# Patient Record
Sex: MALE | Race: WHITE | Employment: FULL TIME | URBAN - METROPOLITAN AREA
[De-identification: names, ages, dates, MRNs, and addresses within clinical notes are randomized per-mention and may not be internally consistent; named-entity substitution may affect disease eponyms.]

---

## 2017-02-01 ENCOUNTER — OFFICE VISIT (OUTPATIENT)
Dept: FAMILY MEDICINE CLINIC | Facility: CLINIC | Age: 47
End: 2017-02-01

## 2017-02-01 VITALS
HEIGHT: 70 IN | BODY MASS INDEX: 39.37 KG/M2 | SYSTOLIC BLOOD PRESSURE: 150 MMHG | TEMPERATURE: 98 F | OXYGEN SATURATION: 98 % | HEART RATE: 96 BPM | DIASTOLIC BLOOD PRESSURE: 100 MMHG | WEIGHT: 275 LBS

## 2017-02-01 DIAGNOSIS — E11.9 TYPE 2 DIABETES MELLITUS WITHOUT COMPLICATION, WITHOUT LONG-TERM CURRENT USE OF INSULIN (HCC): Primary | ICD-10-CM

## 2017-02-01 DIAGNOSIS — I10 ESSENTIAL HYPERTENSION: ICD-10-CM

## 2017-02-01 DIAGNOSIS — G44.219 EPISODIC TENSION-TYPE HEADACHE, NOT INTRACTABLE: ICD-10-CM

## 2017-02-01 DIAGNOSIS — Z00.00 ROUTINE PHYSICAL EXAMINATION: ICD-10-CM

## 2017-02-01 PROCEDURE — 99396 PREV VISIT EST AGE 40-64: CPT | Performed by: FAMILY MEDICINE

## 2017-02-01 PROCEDURE — 90686 IIV4 VACC NO PRSV 0.5 ML IM: CPT | Performed by: FAMILY MEDICINE

## 2017-02-01 PROCEDURE — 90715 TDAP VACCINE 7 YRS/> IM: CPT | Performed by: FAMILY MEDICINE

## 2017-02-01 PROCEDURE — 90472 IMMUNIZATION ADMIN EACH ADD: CPT | Performed by: FAMILY MEDICINE

## 2017-02-01 PROCEDURE — 90471 IMMUNIZATION ADMIN: CPT | Performed by: FAMILY MEDICINE

## 2017-02-01 RX ORDER — LANCETS
1 EACH MISCELLANEOUS 2 TIMES DAILY
Qty: 306 EACH | Refills: 4 | Status: SHIPPED | OUTPATIENT
Start: 2017-02-01 | End: 2020-03-04

## 2017-02-01 RX ORDER — LISINOPRIL AND HYDROCHLOROTHIAZIDE 25; 20 MG/1; MG/1
1 TABLET ORAL
Qty: 90 TABLET | Refills: 0 | Status: SHIPPED | OUTPATIENT
Start: 2017-02-01 | End: 2017-10-09

## 2017-02-01 RX ORDER — WEIGH SCALE
MISCELLANEOUS MISCELLANEOUS
Qty: 1 EACH | Refills: 0 | Status: SHIPPED | OUTPATIENT
Start: 2017-02-01 | End: 2020-03-04

## 2017-02-01 RX ORDER — ASPIRIN 81 MG/1
81 TABLET, CHEWABLE ORAL DAILY
Qty: 100 TABLET | Refills: 5 | Status: SHIPPED | OUTPATIENT
Start: 2017-02-01 | End: 2017-12-20

## 2017-02-01 RX ORDER — DIPHENHYDRAMINE HYDROCHLORIDE 25 MG/1
CAPSULE, LIQUID FILLED ORAL
Qty: 1 KIT | Refills: 0 | Status: SHIPPED | OUTPATIENT
Start: 2017-02-01 | End: 2020-03-04

## 2017-02-01 NOTE — H&P
Tyler Lewis is a 51year old male who presents for a complete physical exam.   HPI:   Patient self discontinued his blood pressure medication, diabetes medication, aspirin  Having bilateral occipital headaches intermittently  Denies vision change, n Dehydration 2012   • Snoring    • Hyperglycemia    • Diabetes ([facility] [address] 1/2/2015   • Effusion of lower leg joint 12/16/2014   • Hyperlipidemia 3/12/2015   • Internal derangement of knee joint 7/19/2013     Left Knee    • Cellulitis    • Hiatal hernia    • CAD good rectal tone, prostate shows no masses  EXTREMITIES: no cyanosis, clubbing or edema  NEURO: Oriented times three,cranial nerves are intact,motor and sensory are grossly intact    ASSESSMENT AND PLAN:   Mariaacamelia Carney is a 51year old male who prese

## 2017-02-02 ENCOUNTER — LAB ENCOUNTER (OUTPATIENT)
Dept: LAB | Age: 47
End: 2017-02-02
Attending: FAMILY MEDICINE
Payer: COMMERCIAL

## 2017-02-02 DIAGNOSIS — G44.219 EPISODIC TENSION-TYPE HEADACHE, NOT INTRACTABLE: ICD-10-CM

## 2017-02-02 DIAGNOSIS — E11.9 TYPE 2 DIABETES MELLITUS WITHOUT COMPLICATION, WITHOUT LONG-TERM CURRENT USE OF INSULIN (HCC): ICD-10-CM

## 2017-02-02 DIAGNOSIS — I10 ESSENTIAL HYPERTENSION: ICD-10-CM

## 2017-02-02 DIAGNOSIS — Z00.00 ROUTINE PHYSICAL EXAMINATION: ICD-10-CM

## 2017-02-02 LAB
25-HYDROXYVITAMIN D (TOTAL): 11.4 NG/ML (ref 30–100)
ALBUMIN SERPL-MCNC: 3.8 G/DL (ref 3.5–4.8)
ALP LIVER SERPL-CCNC: 45 U/L (ref 45–117)
ALT SERPL-CCNC: 23 U/L (ref 17–63)
AST SERPL-CCNC: 9 U/L (ref 15–41)
BASOPHILS # BLD AUTO: 0.05 X10(3) UL (ref 0–0.1)
BASOPHILS NFR BLD AUTO: 0.8 %
BILIRUB SERPL-MCNC: 0.8 MG/DL (ref 0.1–2)
BUN BLD-MCNC: 20 MG/DL (ref 8–20)
CALCIUM BLD-MCNC: 8.8 MG/DL (ref 8.3–10.3)
CHLORIDE: 105 MMOL/L (ref 101–111)
CHOLEST SMN-MCNC: 180 MG/DL (ref ?–200)
CO2: 25 MMOL/L (ref 22–32)
CREAT BLD-MCNC: 1.06 MG/DL (ref 0.7–1.3)
CREAT UR-SCNC: 356 MG/DL
EOSINOPHIL # BLD AUTO: 0.17 X10(3) UL (ref 0–0.3)
EOSINOPHIL NFR BLD AUTO: 2.7 %
ERYTHROCYTE [DISTWIDTH] IN BLOOD BY AUTOMATED COUNT: 12.5 % (ref 11.5–16)
EST. AVERAGE GLUCOSE BLD GHB EST-MCNC: 194 MG/DL (ref 68–126)
GLUCOSE BLD-MCNC: 170 MG/DL (ref 70–99)
HBA1C MFR BLD HPLC: 8.4 % (ref ?–5.7)
HCT VFR BLD AUTO: 44.9 % (ref 37–53)
HDLC SERPL-MCNC: 40 MG/DL (ref 45–?)
HDLC SERPL: 4.5 {RATIO} (ref ?–4.97)
HGB BLD-MCNC: 15.8 G/DL (ref 13–17)
IMMATURE GRANULOCYTE COUNT: 0.03 X10(3) UL (ref 0–1)
IMMATURE GRANULOCYTE RATIO %: 0.5 %
LDLC SERPL CALC-MCNC: 107 MG/DL (ref ?–130)
LYMPHOCYTES # BLD AUTO: 1.87 X10(3) UL (ref 0.9–4)
LYMPHOCYTES NFR BLD AUTO: 30.1 %
M PROTEIN MFR SERPL ELPH: 6.6 G/DL (ref 6.1–8.3)
MCH RBC QN AUTO: 31.2 PG (ref 27–33.2)
MCHC RBC AUTO-ENTMCNC: 35.2 G/DL (ref 31–37)
MCV RBC AUTO: 88.7 FL (ref 80–99)
MICROALBUMIN UR-MCNC: 3.48 MG/DL
MICROALBUMIN/CREAT 24H UR-RTO: 9.8 UG/MG (ref ?–30)
MONOCYTES # BLD AUTO: 0.6 X10(3) UL (ref 0.1–0.6)
MONOCYTES NFR BLD AUTO: 9.6 %
NEUTROPHIL ABS PRELIM: 3.5 X10 (3) UL (ref 1.3–6.7)
NEUTROPHILS # BLD AUTO: 3.5 X10(3) UL (ref 1.3–6.7)
NEUTROPHILS NFR BLD AUTO: 56.3 %
NONHDLC SERPL-MCNC: 140 MG/DL (ref ?–130)
PLATELET # BLD AUTO: 175 10(3)UL (ref 150–450)
POTASSIUM SERPL-SCNC: 4 MMOL/L (ref 3.6–5.1)
PSA SERPL-MCNC: 0.72 NG/ML (ref 0.01–4)
RBC # BLD AUTO: 5.06 X10(6)UL (ref 4.3–5.7)
RED CELL DISTRIBUTION WIDTH-SD: 40.4 FL (ref 35.1–46.3)
SODIUM SERPL-SCNC: 138 MMOL/L (ref 136–144)
TRIGLYCERIDES: 165 MG/DL (ref ?–150)
VLDL: 33 MG/DL (ref 5–40)
WBC # BLD AUTO: 6.2 X10(3) UL (ref 4–13)

## 2017-02-02 PROCEDURE — 85025 COMPLETE CBC W/AUTO DIFF WBC: CPT

## 2017-02-02 PROCEDURE — 83036 HEMOGLOBIN GLYCOSYLATED A1C: CPT

## 2017-02-02 PROCEDURE — 84153 ASSAY OF PSA TOTAL: CPT

## 2017-02-02 PROCEDURE — 80053 COMPREHEN METABOLIC PANEL: CPT

## 2017-02-02 PROCEDURE — 82306 VITAMIN D 25 HYDROXY: CPT

## 2017-02-02 PROCEDURE — 36415 COLL VENOUS BLD VENIPUNCTURE: CPT

## 2017-02-02 PROCEDURE — 82043 UR ALBUMIN QUANTITATIVE: CPT

## 2017-02-02 PROCEDURE — 82570 ASSAY OF URINE CREATININE: CPT

## 2017-02-02 PROCEDURE — 80061 LIPID PANEL: CPT

## 2017-07-24 ENCOUNTER — MED REC SCAN ONLY (OUTPATIENT)
Dept: INTERNAL MEDICINE CLINIC | Facility: CLINIC | Age: 47
End: 2017-07-24

## 2017-09-19 ENCOUNTER — TELEPHONE (OUTPATIENT)
Dept: FAMILY MEDICINE CLINIC | Facility: CLINIC | Age: 47
End: 2017-09-19

## 2017-09-19 NOTE — TELEPHONE ENCOUNTER
MISAEL, asked pt to confirm if he will continue as a pt of Dr. Jaylen Barros or if he will be switching to a new PCP.

## 2017-10-09 ENCOUNTER — OFFICE VISIT (OUTPATIENT)
Dept: INTERNAL MEDICINE CLINIC | Facility: CLINIC | Age: 47
End: 2017-10-09

## 2017-10-09 VITALS
HEART RATE: 68 BPM | SYSTOLIC BLOOD PRESSURE: 132 MMHG | WEIGHT: 264 LBS | TEMPERATURE: 98 F | BODY MASS INDEX: 38 KG/M2 | DIASTOLIC BLOOD PRESSURE: 86 MMHG

## 2017-10-09 DIAGNOSIS — E11.9 TYPE 2 DIABETES MELLITUS WITHOUT COMPLICATION, WITHOUT LONG-TERM CURRENT USE OF INSULIN (HCC): ICD-10-CM

## 2017-10-09 DIAGNOSIS — M54.12 CERVICAL RADICULOPATHY: Primary | ICD-10-CM

## 2017-10-09 DIAGNOSIS — I10 ESSENTIAL HYPERTENSION: ICD-10-CM

## 2017-10-09 PROCEDURE — 90686 IIV4 VACC NO PRSV 0.5 ML IM: CPT | Performed by: FAMILY MEDICINE

## 2017-10-09 PROCEDURE — 90471 IMMUNIZATION ADMIN: CPT | Performed by: FAMILY MEDICINE

## 2017-10-09 PROCEDURE — 99214 OFFICE O/P EST MOD 30 MIN: CPT | Performed by: FAMILY MEDICINE

## 2017-10-09 RX ORDER — LISINOPRIL AND HYDROCHLOROTHIAZIDE 25; 20 MG/1; MG/1
1 TABLET ORAL
Qty: 90 TABLET | Refills: 0 | Status: SHIPPED | OUTPATIENT
Start: 2017-10-09 | End: 2017-12-31

## 2017-10-09 RX ORDER — METHYLPREDNISOLONE 4 MG/1
TABLET ORAL
Qty: 1 KIT | Refills: 0 | Status: SHIPPED | OUTPATIENT
Start: 2017-10-09 | End: 2017-10-20 | Stop reason: ALTCHOICE

## 2017-10-09 NOTE — PROGRESS NOTES
HPI:    Patient ID: Natalie Courser is a 49year old male. HPI Here with pain in upper back on the left side with some tingling in shoulder down to left 5th finger. Has had this in the past. Woke up with this. No clear injury. No weakness.  Was given MetFORMIN HCl 1000 MG Oral Tab Take 1 tablet (1,000 mg total) by mouth 2 (two) times daily with meals. Disp: 180 tablet Rfl: 0   Lisinopril-Hydrochlorothiazide 20-25 MG Oral Tab Take 1 tablet by mouth once daily.  Disp: 90 tablet Rfl: 0   Glucose Blood ([facility] Cervical radiculopathy  (primary encounter diagnosis)  1. Reviewed previous A1c off med and recommendations from previous doctor for Metformin dosing. Restart med and take BID as instructed. Check sugars regularly. Reviewed dose.  Discussed importance of co

## 2017-10-10 NOTE — PATIENT INSTRUCTIONS
Long-Term Complications of Diabetes    Diabetes can cause health problems over time. These are called complications. They are more likely to happen if your blood sugar is often too high. Over time, high blood sugar can damage blood vessels in your body. © 0976-6631 [address], [address]. All rights reserved. This information is not intended as a substitute for professional medical care. Always follow your healthcare professional's instructions.

## 2017-10-16 ENCOUNTER — TELEPHONE (OUTPATIENT)
Dept: INTERNAL MEDICINE CLINIC | Facility: CLINIC | Age: 47
End: 2017-10-16

## 2017-10-16 DIAGNOSIS — M54.12 CERVICAL RADICULOPATHY: Primary | ICD-10-CM

## 2017-10-16 NOTE — TELEPHONE ENCOUNTER
Patient states he would like to schedule the Cervical Spine xray first.  Then would AMS want him to see Dr. Greer Villagomez Neurologist as he saw in the past 4//15/16? Please advise.

## 2017-10-16 NOTE — TELEPHONE ENCOUNTER
Calling with an update. Medication is not really helping still having discomfort and pain. He finished his medication yesterday.  Please advise

## 2017-10-16 NOTE — TELEPHONE ENCOUNTER
I placed an order for an x-ray which he may or may not get- if he wants to do PT he doesn't need the x-ray. The other option is to have him see Neurosurgery and do the x-ray before he sees them. It is up to him.

## 2017-10-16 NOTE — TELEPHONE ENCOUNTER
Patient states he saw [facility] on 10/9/17 with left shoulder pain, numbness down arm, given Medrol Dose pack which he finished last night, noticed today pain and numbness is coming back, no worse and only eased while he was taking the Medrol Dose pack, never we

## 2017-10-17 NOTE — TELEPHONE ENCOUNTER
Patient notified to schedule cervical spine xray and schedule an appt to see Dr. Sindi Villarreal or Neurosurgeon at [facility] for evaluation. Pt verbalizes understanding.

## 2017-10-18 ENCOUNTER — HOSPITAL ENCOUNTER (OUTPATIENT)
Dept: GENERAL RADIOLOGY | Age: 47
Discharge: HOME | End: 2017-10-18
Attending: FAMILY MEDICINE
Payer: COMMERCIAL

## 2017-10-18 DIAGNOSIS — M54.12 CERVICAL RADICULOPATHY: ICD-10-CM

## 2017-10-18 PROCEDURE — 72052 X-RAY EXAM NECK SPINE 6/>VWS: CPT | Performed by: FAMILY MEDICINE

## 2017-11-03 ENCOUNTER — TELEPHONE (OUTPATIENT)
Dept: INTERNAL MEDICINE CLINIC | Facility: CLINIC | Age: 47
End: 2017-11-03

## 2017-11-03 NOTE — TELEPHONE ENCOUNTER
Our Pre-Op paperwork faxed to Dr. Anthony Scott. Left knee replacement on 12/18/17. Paperwork in AMS folder.

## 2017-12-04 ENCOUNTER — HOSPITAL ENCOUNTER (OUTPATIENT)
Dept: PHYSICAL THERAPY | Facility: HOSPITAL | Age: 47
Discharge: HOME | End: 2017-12-04
Attending: ORTHOPAEDIC SURGERY
Payer: COMMERCIAL

## 2017-12-04 ENCOUNTER — APPOINTMENT (OUTPATIENT)
Dept: LAB | Facility: HOSPITAL | Age: 47
End: 2017-12-04
Attending: ORTHOPAEDIC SURGERY
Payer: COMMERCIAL

## 2017-12-04 DIAGNOSIS — M17.12 OSTEOARTHRITIS OF LEFT KNEE: ICD-10-CM

## 2017-12-04 DIAGNOSIS — E11.9 TYPE 2 DIABETES MELLITUS WITHOUT COMPLICATION, WITHOUT LONG-TERM CURRENT USE OF INSULIN (HCC): ICD-10-CM

## 2017-12-04 PROCEDURE — 93010 ELECTROCARDIOGRAM REPORT: CPT | Performed by: INTERNAL MEDICINE

## 2017-12-04 PROCEDURE — 87081 CULTURE SCREEN ONLY: CPT

## 2017-12-04 PROCEDURE — 36415 COLL VENOUS BLD VENIPUNCTURE: CPT

## 2017-12-04 PROCEDURE — 85025 COMPLETE CBC W/AUTO DIFF WBC: CPT

## 2017-12-04 PROCEDURE — 80053 COMPREHEN METABOLIC PANEL: CPT

## 2017-12-04 PROCEDURE — 83036 HEMOGLOBIN GLYCOSYLATED A1C: CPT

## 2017-12-04 PROCEDURE — 86900 BLOOD TYPING SEROLOGIC ABO: CPT

## 2017-12-04 PROCEDURE — 86850 RBC ANTIBODY SCREEN: CPT

## 2017-12-04 PROCEDURE — 93005 ELECTROCARDIOGRAM TRACING: CPT

## 2017-12-04 PROCEDURE — 86901 BLOOD TYPING SEROLOGIC RH(D): CPT

## 2017-12-04 PROCEDURE — 85730 THROMBOPLASTIN TIME PARTIAL: CPT

## 2017-12-04 PROCEDURE — 85610 PROTHROMBIN TIME: CPT

## 2017-12-07 ENCOUNTER — TELEPHONE (OUTPATIENT)
Dept: INTERNAL MEDICINE CLINIC | Facility: CLINIC | Age: 47
End: 2017-12-07

## 2017-12-07 ENCOUNTER — OFFICE VISIT (OUTPATIENT)
Dept: INTERNAL MEDICINE CLINIC | Facility: CLINIC | Age: 47
End: 2017-12-07

## 2017-12-07 VITALS
TEMPERATURE: 98 F | WEIGHT: 253 LBS | HEART RATE: 64 BPM | RESPIRATION RATE: 16 BRPM | HEIGHT: 70 IN | DIASTOLIC BLOOD PRESSURE: 70 MMHG | BODY MASS INDEX: 36.22 KG/M2 | SYSTOLIC BLOOD PRESSURE: 122 MMHG

## 2017-12-07 DIAGNOSIS — Z01.818 PREOPERATIVE EXAMINATION: Primary | ICD-10-CM

## 2017-12-07 DIAGNOSIS — E78.5 HYPERLIPIDEMIA, UNSPECIFIED HYPERLIPIDEMIA TYPE: ICD-10-CM

## 2017-12-07 DIAGNOSIS — I10 ESSENTIAL HYPERTENSION: ICD-10-CM

## 2017-12-07 DIAGNOSIS — E11.9 TYPE 2 DIABETES MELLITUS WITHOUT COMPLICATION, WITHOUT LONG-TERM CURRENT USE OF INSULIN (HCC): ICD-10-CM

## 2017-12-07 PROCEDURE — 99243 OFF/OP CNSLTJ NEW/EST LOW 30: CPT | Performed by: FAMILY MEDICINE

## 2017-12-07 NOTE — PATIENT INSTRUCTIONS
Follow-up with me one week after your surgery. Check A1c in one month. You can come here for that test- non-fasting.

## 2017-12-07 NOTE — PROGRESS NOTES
HPI:    Patient ID: Bonita Eisenmenger is a 49year old male. HPI Here for preoperative exam for planned left total knee replacement scheduled for 12/18/17 with Dr. Vidal Manzanares.  Request was sent for consultation due to patient history of diabetes and hyperlipid Comment: socially    Drug use: No    Sexual activity: Not on file     Other Topics Concern   None on file     Social History Narrative   None on file     Family History   Problem Relation Age of Onset   • Cancer Neg    • Heart Disease Neg    • Stroke Neg by mouth daily.  Disp: 100 tablet Rfl: 5   Blood Glucose Monitoring Suppl (BLOOD GLUCOSE MONITOR SYSTEM) w/Device Does not apply Kit As directed / patient choice Disp: 1 kit Rfl: 0   Blood Pressure Monitoring (BLOOD PRESSURE MONITOR/L CUFF) Does not apply M Take 1 tablet by mouth daily.            Imaging & Referrals:  None       UP#[id number]

## 2017-12-08 NOTE — TELEPHONE ENCOUNTER
Faxed H & P,ekg and labs to [facility] at Dr. Scanlon Ready office [phone].

## 2017-12-17 ENCOUNTER — ANESTHESIA EVENT (OUTPATIENT)
Dept: SURGERY | Facility: HOSPITAL | Age: 47
DRG: 470 | End: 2017-12-17
Payer: COMMERCIAL

## 2017-12-17 NOTE — ANESTHESIA PREPROCEDURE EVALUATION
PRE-OP EVALUATION    Patient Name: Kenyon Jaime    Pre-op Diagnosis: LEFT KNEE OSTEOARTHRITIS    Procedure(s):  LEFT TOTAL KNEE REPLACEMENT    Surgeon(s) and Role:     Zena Farr MD - Primary    Pre-op vitals reviewed.         Body mass index is 3 reviewed.   Exercise tolerance: good     MET: >4    (+) obesity  (+) hypertension   (+) hyperlipidemia  (+) CAD    (-) CABG/stent    (+) valvular problems/murmurs and MVP and asymptomatic                       Endo/Other      (+) diabetes  type 2, not using Admission:  **None**    Patient seen in preoperative holding area prior to going back to OR. Plan discussed in detail. Spinal discussed. Risks of bleeding, infection, PDPH, neuropraxia were mentioned.   General anesthesia as a means of backup in the ca

## 2017-12-18 ENCOUNTER — HOSPITAL ENCOUNTER (INPATIENT)
Facility: HOSPITAL | Age: 47
LOS: 2 days | Discharge: HOME HEALTH | DRG: 470 | End: 2017-12-20
Attending: ORTHOPAEDIC SURGERY | Admitting: ORTHOPAEDIC SURGERY
Payer: COMMERCIAL

## 2017-12-18 ENCOUNTER — APPOINTMENT (OUTPATIENT)
Dept: GENERAL RADIOLOGY | Facility: HOSPITAL | Age: 47
DRG: 470 | End: 2017-12-18
Attending: PHYSICIAN ASSISTANT
Payer: COMMERCIAL

## 2017-12-18 ENCOUNTER — SURGERY (OUTPATIENT)
Age: 47
End: 2017-12-18

## 2017-12-18 ENCOUNTER — ANESTHESIA (OUTPATIENT)
Dept: SURGERY | Facility: HOSPITAL | Age: 47
DRG: 470 | End: 2017-12-18
Payer: COMMERCIAL

## 2017-12-18 DIAGNOSIS — M17.12 OSTEOARTHRITIS OF LEFT KNEE: Primary | ICD-10-CM

## 2017-12-18 PROCEDURE — 73560 X-RAY EXAM OF KNEE 1 OR 2: CPT | Performed by: PHYSICIAN ASSISTANT

## 2017-12-18 PROCEDURE — 99253 IP/OBS CNSLTJ NEW/EST LOW 45: CPT | Performed by: HOSPITALIST

## 2017-12-18 PROCEDURE — 0SRD0J9 REPLACEMENT OF LEFT KNEE JOINT WITH SYNTHETIC SUBSTITUTE, CEMENTED, OPEN APPROACH: ICD-10-PCS | Performed by: ORTHOPAEDIC SURGERY

## 2017-12-18 PROCEDURE — 3E0T3BZ INTRODUCTION OF ANESTHETIC AGENT INTO PERIPHERAL NERVES AND PLEXI, PERCUTANEOUS APPROACH: ICD-10-PCS | Performed by: ANESTHESIOLOGY

## 2017-12-18 DEVICE — ATTUNE KNEE SYSTEM FEMORAL POSTERIOR STABILIZED SIZE 8 LEFT CEMENTED
Type: IMPLANTABLE DEVICE | Site: KNEE | Status: FUNCTIONAL
Brand: ATTUNE

## 2017-12-18 DEVICE — ATTUNE KNEE SYSTEM TIBIAL BASE ROTATING PLATFORM SIZE 8 CEMENTED
Type: IMPLANTABLE DEVICE | Site: KNEE | Status: FUNCTIONAL
Brand: ATTUNE

## 2017-12-18 DEVICE — ATTUNE KNEE SYSTEM TIBIAL INSERT ROTATING PLATFORM POSTERIOR STABILIZED SIZE 8 8MM AOX
Type: IMPLANTABLE DEVICE | Site: KNEE | Status: FUNCTIONAL
Brand: ATTUNE

## 2017-12-18 DEVICE — CEMENT BONE RADIOPAQ HOWMEDICA: Type: IMPLANTABLE DEVICE | Site: KNEE | Status: FUNCTIONAL

## 2017-12-18 DEVICE — ATTUNE PINNING SYSTEM
Type: IMPLANTABLE DEVICE | Site: KNEE | Status: FUNCTIONAL
Brand: ATTUNE

## 2017-12-18 RX ORDER — MELATONIN
325
Status: DISCONTINUED | OUTPATIENT
Start: 2017-12-19 | End: 2017-12-20

## 2017-12-18 RX ORDER — DOCUSATE SODIUM 100 MG/1
100 CAPSULE, LIQUID FILLED ORAL 2 TIMES DAILY
Status: DISCONTINUED | OUTPATIENT
Start: 2017-12-18 | End: 2017-12-20

## 2017-12-18 RX ORDER — OXYCODONE HYDROCHLORIDE AND ACETAMINOPHEN 5; 325 MG/1; MG/1
1 TABLET ORAL EVERY 4 HOURS PRN
Qty: 80 TABLET | Refills: 0 | Status: SHIPPED | OUTPATIENT
Start: 2017-12-18 | End: 2017-12-28

## 2017-12-18 RX ORDER — CEFAZOLIN SODIUM/WATER 2 G/20 ML
2 SYRINGE (ML) INTRAVENOUS ONCE
Status: DISCONTINUED | OUTPATIENT
Start: 2017-12-18 | End: 2017-12-18 | Stop reason: HOSPADM

## 2017-12-18 RX ORDER — SCOLOPAMINE TRANSDERMAL SYSTEM 1 MG/1
1 PATCH, EXTENDED RELEASE TRANSDERMAL ONCE
Status: DISCONTINUED | OUTPATIENT
Start: 2017-12-18 | End: 2017-12-20

## 2017-12-18 RX ORDER — DEXTROSE MONOHYDRATE 25 G/50ML
50 INJECTION, SOLUTION INTRAVENOUS
Status: DISCONTINUED | OUTPATIENT
Start: 2017-12-18 | End: 2017-12-18 | Stop reason: HOSPADM

## 2017-12-18 RX ORDER — HYDROMORPHONE HYDROCHLORIDE 1 MG/ML
0.8 INJECTION, SOLUTION INTRAMUSCULAR; INTRAVENOUS; SUBCUTANEOUS EVERY 2 HOUR PRN
Status: DISPENSED | OUTPATIENT
Start: 2017-12-18 | End: 2017-12-20

## 2017-12-18 RX ORDER — POLYETHYLENE GLYCOL 3350 17 G/17G
17 POWDER, FOR SOLUTION ORAL DAILY PRN
Status: DISCONTINUED | OUTPATIENT
Start: 2017-12-18 | End: 2017-12-20

## 2017-12-18 RX ORDER — ONDANSETRON 2 MG/ML
4 INJECTION INTRAMUSCULAR; INTRAVENOUS EVERY 4 HOURS PRN
Status: DISPENSED | OUTPATIENT
Start: 2017-12-18 | End: 2017-12-20

## 2017-12-18 RX ORDER — DIPHENHYDRAMINE HYDROCHLORIDE 50 MG/ML
12.5 INJECTION INTRAMUSCULAR; INTRAVENOUS EVERY 4 HOURS PRN
Status: DISCONTINUED | OUTPATIENT
Start: 2017-12-18 | End: 2017-12-20

## 2017-12-18 RX ORDER — SODIUM PHOSPHATE, DIBASIC AND SODIUM PHOSPHATE, MONOBASIC 7; 19 G/133ML; G/133ML
1 ENEMA RECTAL ONCE AS NEEDED
Status: DISCONTINUED | OUTPATIENT
Start: 2017-12-18 | End: 2017-12-20

## 2017-12-18 RX ORDER — CEFAZOLIN SODIUM/WATER 2 G/20 ML
2 SYRINGE (ML) INTRAVENOUS EVERY 8 HOURS
Status: COMPLETED | OUTPATIENT
Start: 2017-12-18 | End: 2017-12-19

## 2017-12-18 RX ORDER — METOCLOPRAMIDE HYDROCHLORIDE 5 MG/ML
10 INJECTION INTRAMUSCULAR; INTRAVENOUS AS NEEDED
Status: DISCONTINUED | OUTPATIENT
Start: 2017-12-18 | End: 2017-12-18 | Stop reason: HOSPADM

## 2017-12-18 RX ORDER — DIPHENHYDRAMINE HCL 25 MG
25 CAPSULE ORAL EVERY 4 HOURS PRN
Status: DISCONTINUED | OUTPATIENT
Start: 2017-12-18 | End: 2017-12-20

## 2017-12-18 RX ORDER — CYCLOBENZAPRINE HCL 5 MG
5 TABLET ORAL 3 TIMES DAILY PRN
Status: DISCONTINUED | OUTPATIENT
Start: 2017-12-18 | End: 2017-12-20

## 2017-12-18 RX ORDER — GABAPENTIN 300 MG/1
600 CAPSULE ORAL 3 TIMES DAILY
COMMUNITY
End: 2020-03-04 | Stop reason: ALTCHOICE

## 2017-12-18 RX ORDER — OXYCODONE HYDROCHLORIDE 5 MG/1
5 TABLET ORAL EVERY 4 HOURS PRN
Status: ACTIVE | OUTPATIENT
Start: 2017-12-18 | End: 2017-12-20

## 2017-12-18 RX ORDER — MIDAZOLAM HYDROCHLORIDE 1 MG/ML
1 INJECTION INTRAMUSCULAR; INTRAVENOUS EVERY 5 MIN PRN
Status: DISCONTINUED | OUTPATIENT
Start: 2017-12-18 | End: 2017-12-18 | Stop reason: HOSPADM

## 2017-12-18 RX ORDER — OXYCODONE HYDROCHLORIDE 10 MG/1
10 TABLET ORAL EVERY 4 HOURS PRN
Status: DISPENSED | OUTPATIENT
Start: 2017-12-18 | End: 2017-12-20

## 2017-12-18 RX ORDER — HYDROXYZINE PAMOATE 25 MG/1
25 CAPSULE ORAL 3 TIMES DAILY PRN
Qty: 60 CAPSULE | Refills: 0 | Status: SHIPPED | OUTPATIENT
Start: 2017-12-18 | End: 2018-02-27

## 2017-12-18 RX ORDER — INSULIN ASPART 100 [IU]/ML
INJECTION, SOLUTION INTRAVENOUS; SUBCUTANEOUS ONCE
Status: DISCONTINUED | OUTPATIENT
Start: 2017-12-18 | End: 2017-12-18 | Stop reason: HOSPADM

## 2017-12-18 RX ORDER — OXYCODONE HCL 10 MG/1
10 TABLET, FILM COATED, EXTENDED RELEASE ORAL
Status: COMPLETED | OUTPATIENT
Start: 2017-12-18 | End: 2017-12-18

## 2017-12-18 RX ORDER — SODIUM CHLORIDE 9 MG/ML
INJECTION, SOLUTION INTRAVENOUS CONTINUOUS
Status: DISCONTINUED | OUTPATIENT
Start: 2017-12-18 | End: 2017-12-20

## 2017-12-18 RX ORDER — KETOROLAC TROMETHAMINE 30 MG/ML
30 INJECTION, SOLUTION INTRAMUSCULAR; INTRAVENOUS EVERY 6 HOURS
Status: COMPLETED | OUTPATIENT
Start: 2017-12-18 | End: 2017-12-19

## 2017-12-18 RX ORDER — DEXTROSE MONOHYDRATE 25 G/50ML
50 INJECTION, SOLUTION INTRAVENOUS
Status: DISCONTINUED | OUTPATIENT
Start: 2017-12-18 | End: 2017-12-20

## 2017-12-18 RX ORDER — ONDANSETRON 2 MG/ML
4 INJECTION INTRAMUSCULAR; INTRAVENOUS AS NEEDED
Status: DISCONTINUED | OUTPATIENT
Start: 2017-12-18 | End: 2017-12-18 | Stop reason: HOSPADM

## 2017-12-18 RX ORDER — CELECOXIB 200 MG/1
200 CAPSULE ORAL DAILY
Qty: 30 CAPSULE | Refills: 0 | Status: SHIPPED | OUTPATIENT
Start: 2017-12-18 | End: 2018-02-27 | Stop reason: ALTCHOICE

## 2017-12-18 RX ORDER — CEFAZOLIN SODIUM 1 G/3ML
INJECTION, POWDER, FOR SOLUTION INTRAMUSCULAR; INTRAVENOUS
Status: DISCONTINUED | OUTPATIENT
Start: 2017-12-18 | End: 2017-12-18 | Stop reason: HOSPADM

## 2017-12-18 RX ORDER — OXYCODONE HYDROCHLORIDE 5 MG/1
2.5 TABLET ORAL EVERY 4 HOURS PRN
Status: ACTIVE | OUTPATIENT
Start: 2017-12-18 | End: 2017-12-20

## 2017-12-18 RX ORDER — BISACODYL 10 MG
10 SUPPOSITORY, RECTAL RECTAL
Status: DISCONTINUED | OUTPATIENT
Start: 2017-12-18 | End: 2017-12-20

## 2017-12-18 RX ORDER — HYDROMORPHONE HYDROCHLORIDE 1 MG/ML
0.2 INJECTION, SOLUTION INTRAMUSCULAR; INTRAVENOUS; SUBCUTANEOUS EVERY 2 HOUR PRN
Status: ACTIVE | OUTPATIENT
Start: 2017-12-18 | End: 2017-12-20

## 2017-12-18 RX ORDER — HYDROMORPHONE HYDROCHLORIDE 1 MG/ML
0.4 INJECTION, SOLUTION INTRAMUSCULAR; INTRAVENOUS; SUBCUTANEOUS EVERY 5 MIN PRN
Status: DISCONTINUED | OUTPATIENT
Start: 2017-12-18 | End: 2017-12-18 | Stop reason: HOSPADM

## 2017-12-18 RX ORDER — DOCUSATE SODIUM 100 MG/1
100 CAPSULE, LIQUID FILLED ORAL 2 TIMES DAILY
Qty: 60 CAPSULE | Refills: 0 | Status: SHIPPED | OUTPATIENT
Start: 2017-12-18 | End: 2018-02-27 | Stop reason: ALTCHOICE

## 2017-12-18 RX ORDER — BUPIVACAINE HYDROCHLORIDE AND EPINEPHRINE 2.5; 5 MG/ML; UG/ML
INJECTION, SOLUTION EPIDURAL; INFILTRATION; INTRACAUDAL; PERINEURAL AS NEEDED
Status: DISCONTINUED | OUTPATIENT
Start: 2017-12-18 | End: 2017-12-18 | Stop reason: HOSPADM

## 2017-12-18 RX ORDER — NALOXONE HYDROCHLORIDE 0.4 MG/ML
80 INJECTION, SOLUTION INTRAMUSCULAR; INTRAVENOUS; SUBCUTANEOUS AS NEEDED
Status: DISCONTINUED | OUTPATIENT
Start: 2017-12-18 | End: 2017-12-18 | Stop reason: HOSPADM

## 2017-12-18 RX ORDER — GABAPENTIN 300 MG/1
600 CAPSULE ORAL 3 TIMES DAILY
Status: DISCONTINUED | OUTPATIENT
Start: 2017-12-18 | End: 2017-12-20

## 2017-12-18 RX ORDER — ACETAMINOPHEN 325 MG/1
650 TABLET ORAL ONCE
Status: DISCONTINUED | OUTPATIENT
Start: 2017-12-18 | End: 2017-12-18 | Stop reason: HOSPADM

## 2017-12-18 RX ORDER — SODIUM CHLORIDE, SODIUM LACTATE, POTASSIUM CHLORIDE, CALCIUM CHLORIDE 600; 310; 30; 20 MG/100ML; MG/100ML; MG/100ML; MG/100ML
INJECTION, SOLUTION INTRAVENOUS CONTINUOUS
Status: DISCONTINUED | OUTPATIENT
Start: 2017-12-18 | End: 2017-12-18 | Stop reason: HOSPADM

## 2017-12-18 RX ORDER — MEPERIDINE HYDROCHLORIDE 25 MG/ML
12.5 INJECTION INTRAMUSCULAR; INTRAVENOUS; SUBCUTANEOUS AS NEEDED
Status: DISCONTINUED | OUTPATIENT
Start: 2017-12-18 | End: 2017-12-18 | Stop reason: HOSPADM

## 2017-12-18 RX ORDER — SODIUM CHLORIDE, SODIUM LACTATE, POTASSIUM CHLORIDE, CALCIUM CHLORIDE 600; 310; 30; 20 MG/100ML; MG/100ML; MG/100ML; MG/100ML
INJECTION, SOLUTION INTRAVENOUS CONTINUOUS
Status: DISCONTINUED | OUTPATIENT
Start: 2017-12-18 | End: 2017-12-18

## 2017-12-18 RX ORDER — DIPHENHYDRAMINE HYDROCHLORIDE 50 MG/ML
25 INJECTION INTRAMUSCULAR; INTRAVENOUS ONCE AS NEEDED
Status: ACTIVE | OUTPATIENT
Start: 2017-12-18 | End: 2017-12-18

## 2017-12-18 RX ORDER — SENNOSIDES 8.6 MG
17.2 TABLET ORAL NIGHTLY
Status: DISCONTINUED | OUTPATIENT
Start: 2017-12-18 | End: 2017-12-20

## 2017-12-18 RX ORDER — METOCLOPRAMIDE HYDROCHLORIDE 5 MG/ML
10 INJECTION INTRAMUSCULAR; INTRAVENOUS EVERY 6 HOURS PRN
Status: DISPENSED | OUTPATIENT
Start: 2017-12-18 | End: 2017-12-20

## 2017-12-18 RX ORDER — HYDROMORPHONE HYDROCHLORIDE 1 MG/ML
0.4 INJECTION, SOLUTION INTRAMUSCULAR; INTRAVENOUS; SUBCUTANEOUS EVERY 2 HOUR PRN
Status: DISPENSED | OUTPATIENT
Start: 2017-12-18 | End: 2017-12-20

## 2017-12-18 RX ORDER — OXYCODONE HCL 10 MG/1
10 TABLET, FILM COATED, EXTENDED RELEASE ORAL
Status: COMPLETED | OUTPATIENT
Start: 2017-12-18 | End: 2017-12-19

## 2017-12-18 RX ORDER — ACETAMINOPHEN 325 MG/1
650 TABLET ORAL 4 TIMES DAILY
Status: DISCONTINUED | OUTPATIENT
Start: 2017-12-18 | End: 2017-12-19

## 2017-12-18 NOTE — PROGRESS NOTES
Valerie Mike   10/18/2017 7:45 AM   Office Visit   MRN:  [id number]   Description: 49year old male Provider: Suzette Lemus MD Department: [facility]   Scanning Cover Sheet     Click to print [address] for scanning   Office Comment: hernia repair  No date: OTHER SURGICAL HISTORY      Comment: Nose, and \"broken arm\"        Family History   Problem Relation Age of Onset   • Cancer Neg     • Heart Disease Neg     • Stroke Neg         reports that he has never smoked.  He ha Patient’s diagnosis and treatment options were reviewed. What osteoarthritis is and what the severity of patient's disease was discussed.   Conservative care with symptomatic management including weight control, physical exercises/therapy, medications, inj

## 2017-12-18 NOTE — PHYSICAL THERAPY NOTE
PHYSICAL THERAPY KNEE EVALUATION - INPATIENT     Room Number: 353/353-A  Evaluation Date: 12/18/2017  Type of Evaluation: Initial  Physician Order: PT Eval and Treat    Presenting Problem: s/p left TKA 12/18/17  Reason for Therapy: Mobility Dysfunction and home    SUBJECTIVE  \"This really isn't too bad\" referring to walking    Patient self-stated goal is to go home    OBJECTIVE  Precautions: None  Fall Risk: Standard fall risk    WEIGHT BEARING RESTRICTION  Weight Bearing Restriction: L lower extremity ABILITY STATUS  Gait Assessment   Gait Assistance: Minimum assistance  Distance (ft): 150  Assistive Device: Rolling walker  Pattern: L Decreased stance time  Stoop/Curb Assistance: Not tested       Skilled Therapy Provided: Pt recd in supine, spouse shelbi presentation is stable and overall the evaluation complexity is considered low. These impairments and comorbidities manifest themselves as functional limitations in independent bed mobility, transfers, and gait.   The patient is below baseline and would be

## 2017-12-18 NOTE — CONSULTS
NATHANIEL HOSPITALIST  CONSULT     Berta Mike Patient Status:  Inpatient    1970 MRN [id number]   [facility] 3SW[facility]A Attending Bib Swan MD   Hosp Day # 0 PCP Vivian Armas DO     Reason for consult: medical management    Requ reports that he does not use drugs.     Family History:   Family History   Problem Relation Age of Onset   • Cancer Neg    • Heart Disease Neg    • Stroke Neg        Allergies: No Known Allergies    Medications:    No current facility-administered medicatio and Back: No tenderness or deformity. Abdomen: Soft, nontender, nondistended. Positive bowel sounds. No rebound, guarding or organomegaly. Neurologic: No focal neurological deficits. CNII-XII grossly intact. Musculoskeletal: Moves all extremities.   Ext

## 2017-12-18 NOTE — OPERATIVE REPORT
TOTAL KNEE OPERATIVE REPORT:  Kenyon Jaime       [id number]     5/6/1970    PREOP DX: LEFT  KNEE PRIMARY OSTEOARTHRITIS  POSTOP DX:LEFT KNEE PRIMARY OSTEOARTHRITIS  PROCEDURE: LEFT TOTAL KNEE REPLACEMENT  SURGEON: MD FIRST Jesus Patterson FEMUR WAS FINISHED OFF WITH CHAMFER AND [address] CUTS IN USUAL FASHION. POSTERIOR FEMORAL OSTEOPHYTES WERE REMOVED. POSTERIOR CAPSULAR RELEASE WAS DONE CAREFULLY. PROXIMAL TIBIA WAS EXPOSED.   TIBIA WAS SIZED at 8 AND ROTATION WAS SET USING MEDIAL 1/3 OF

## 2017-12-18 NOTE — ANESTHESIA POSTPROCEDURE EVALUATION
[facility] Patient Status:  Surgery Admit   Age/Gender 49year old male MRN [id number]   [facility] SURGERY Attending Svetlaan Zheng MD   Hosp Day # 0 PCP Dorota Garcias DO       Anesthesia Post-op Note    Procedure(s

## 2017-12-18 NOTE — H&P
Valerie Mederos Cee   12/7/2017 9:00 AM   Office Visit   MRN:  [id number]   Description: 49year old male Provider: Shubham Rolon DO Department: [facility] [address] to print [address] for scanning   Off • Skin tag 10/2012   • Snoring     • Unspecified essential hypertension     • Visual impairment       glasses   • Vomiting 2012      Past Surgical History:  No date: HERNIA SURGERY      Comment: hernia repair  No date: OTHER      Comment: right ankle soft Empagliflozin (JARDIANCE) 10 MG Oral Tab Take 1 tablet by mouth daily.  Disp: 30 tablet Rfl: 0   gabapentin 300 MG Oral Cap 1 tab nightly 2 days then 1 tab BID 4 days then 1 tab TID Disp: 90 capsule Rfl: 3   MetFORMIN HCl 1000 MG Oral Tab Take 1 tablet (1,0 Psychiatric: He has a normal mood and affect.  His behavior is normal.   Vitals reviewed.                ASSESSMENT/PLAN:   Preoperative examination  (primary encounter diagnosis)  Type 2 diabetes mellitus without complication, without long-term current use /99 (BP Location: Left arm)   Pulse 88   Temp 98.4 °F (36.9 °C) (Temporal)   Resp 17   Ht 5' 10\" (1.778 m)   Wt 241 lb 15.3 oz (109.8 kg)   SpO2 98%   BMI 34.72 kg/m²     left leg skin intact. Both calves are NT  MS intact. +DP    Labs reviewed.

## 2017-12-19 PROCEDURE — 99232 SBSQ HOSP IP/OBS MODERATE 35: CPT | Performed by: HOSPITALIST

## 2017-12-19 RX ORDER — OXYCODONE HYDROCHLORIDE AND ACETAMINOPHEN 5; 325 MG/1; MG/1
2 TABLET ORAL EVERY 4 HOURS PRN
Status: DISCONTINUED | OUTPATIENT
Start: 2017-12-20 | End: 2017-12-20

## 2017-12-19 RX ORDER — OXYCODONE HYDROCHLORIDE AND ACETAMINOPHEN 5; 325 MG/1; MG/1
1 TABLET ORAL EVERY 4 HOURS PRN
Status: DISCONTINUED | OUTPATIENT
Start: 2017-12-20 | End: 2017-12-20

## 2017-12-19 RX ORDER — ACETAMINOPHEN 325 MG/1
650 TABLET ORAL 4 TIMES DAILY
Status: COMPLETED | OUTPATIENT
Start: 2017-12-19 | End: 2017-12-19

## 2017-12-19 NOTE — PAYOR COMM NOTE
--------------  Signed Inpt order for ELIOT  noted in Media. This is an inpt-only procedure.     ADMISSION REVIEW     Payor: ALL SAVERS  Subscriber #:  [de-identified]  Authorization Number: [id number]    Admit date: 12/18/17  Admit time: 1252     DIRECT TO OR FOR: 7553 New Bag 2 g Intravenous Tiff Valenzuela RN    12/18/2017 1828 New Bag 2 g Intravenous Pattie Kasper RN      docusate sodium (COLACE) cap 100 mg     Date Action Dose Route User    12/19/2017 0849 Given 100 mg Oral Pattie Kasper RN    12/18/2017 2056 Clare Grubbs tab 10 mg     Date Action Dose Route User    12/19/2017 1221 Given 10 mg Oral Shelia Kip, RN    12/19/2017 0850 Given 10 mg Oral Shelia Kip, RN    12/18/2017 2058 Given 10 mg Oral Junella Arlene, RN    12/18/2017 1532 Given 10 mg Oral Shelia Idojing, RN

## 2017-12-19 NOTE — HOME CARE LIAISON
MET WITH PTNT TO DISCUSS HOME HEALTH SERVICES AND COVERAGE CRITERIA. PTNT AGREEABLE TO Anoop Silva. PTNT GIVEN RESIDENTIAL BROCHURE. RESIDENTIAL WITH PROVIDE SN/PT ON DISCHARGE.     Thank you for this referral,   Jordin Vega

## 2017-12-19 NOTE — CM/SW NOTE
12/19/17 1500   CM/SW Referral Data   Referral Source Physician   Reason for Referral Discharge planning   Informant Patient;Edward Staff   Pertinent Medical Hx   Primary Care Physician Name DO Latanya   Patient Info   Patient's Mental Status Alert

## 2017-12-19 NOTE — OCCUPATIONAL THERAPY NOTE
OCCUPATIONAL THERAPY QUICK EVALUATION - INPATIENT    Room Number: 353/353-A  Evaluation Date: 12/19/2017     Type of Evaluation: Quick Eval  Presenting Problem: s/p L TKR    Physician Order: IP Consult to Occupational Therapy  Reason for Therapy:  ADL/IADL shower; Shower chair       Occupation/Status: FT   Hand Dominance: Right  Drives: Yes  Patient Regularly Uses: Glasses    Prior Level of Function: I with ADL/IADLs without use of AD. Patient works full time.   Patient has children and and toilet with mod I w/ good hand placement. Patient has comfort height toilet seats and has a shower chair. Patient End of Session: Up in chair; With [facility] staff;Needs met;Call light within reach;RN aware of session/findings; All patient questions a to dress lower extremities: at previous functional level  Patient/Caregiver able to demonstrate safety with ADLS: at previous functional level

## 2017-12-19 NOTE — PHYSICAL THERAPY NOTE
PHYSICAL THERAPY KNEE TREATMENT NOTE - INPATIENT     Room Number: 353/353-A     Session: 1&2   Number of Visits to Meet Established Goals: 3    Presenting Problem: s/p left TKA 12/18/17    Problem List  Active Problems:    Essential hypertension    Type 2 Good  Dynamic Sitting: Good  Static Standing: Fair -  Dynamic Standing: Poor +    ACTIVITY TOLERANCE  Room air  No shortness of breath    AM-PAC '6-Clicks' INPATIENT SHORT FORM - BASIC MOBILITY  How much difficulty does the patient currently have. ..  -   T Flexion 10 reps 15 reps   Standing heel/toe raises 10 reps 15 reps   Standing knee flexion 10 reps 15 reps   Extension stretch  1x 1x     Comments: Pt participated in group session, tolerance was good.    was present yes   is a son    Knee ROM flexion      Goal #6        Goal Comments: Goals established on 12/18/2017  Progressing toward all

## 2017-12-19 NOTE — PROGRESS NOTES
NATHANIEL HOSPITALIST  Progress Note     Clemente Evans Patient Status:  Inpatient    1970 MRN [id number]   [facility] 3SW-A Attending Chuck Rodriguez MD   Hosp Day # 1 PCP Renee Chaparro DO     Chief Complaint: medical management    S: ASSESSMENT / PLAN:     1. Essential hypertension  1. Hold antihypertensives, restart in am if BP starts to trend up   2. Was hypotensive yesterday  2. DM type II  1. Hyperglycemia protocol  2. novolog SSI  3. Cerivical radiculopathy  1.  Gabapentin

## 2017-12-20 VITALS
HEART RATE: 94 BPM | BODY MASS INDEX: 34.64 KG/M2 | OXYGEN SATURATION: 97 % | WEIGHT: 241.94 LBS | TEMPERATURE: 99 F | RESPIRATION RATE: 18 BRPM | DIASTOLIC BLOOD PRESSURE: 83 MMHG | HEIGHT: 70 IN | SYSTOLIC BLOOD PRESSURE: 142 MMHG

## 2017-12-20 PROCEDURE — 99231 SBSQ HOSP IP/OBS SF/LOW 25: CPT | Performed by: INTERNAL MEDICINE

## 2017-12-20 NOTE — PROGRESS NOTES
NATHANIEL HOSPITALIST  Progress Note     Marcus Herndon Patient Status:  Inpatient    1970 MRN [id number]   [facility] 3SW-A Attending Nadia Akers MD   Hosp Day # 2 PCP Jazz Fay DO     Chief Complaint: medical management    S: ASSESSMENT / PLAN:     1. Essential hypertension  1. Resume BP meds on DC  2. DM type II  1. Hyperglycemia protocol  2. novolog SSI  3. Cerivical radiculopathy  1. Gabapentin   4. OA s/p Knee replacement  1. PT/OT  2. Pain control  3. IS  5.  Dyslipid

## 2017-12-20 NOTE — PROGRESS NOTES
Orthopedic surgery progress note    Gopi Anderson Patient Status:  Inpatient    1970 MRN [id number]   [facility] 3SW-A Attending Svetlana Zheng MD   [facility] Day # 2 PCP Dorota Garcias DO       Subjective:  No complaints.  No calf pain,

## 2017-12-20 NOTE — PLAN OF CARE
Diabetes/Glucose Control    • Glucose maintained within prescribed range Progressing        DISCHARGE PLANNING    • Discharge to home or other facility with appropriate resources Progressing        Impaired Functional Mobility    • Achieve highest/safest l

## 2017-12-20 NOTE — PROGRESS NOTES
Acute Pain Service    Post Op Day 2 Ortho Note    Assessed patient in chair. Patient rates pain 4-5/10 at present. Patient states Percocet is working well to manage pain; denies itching/nausea/dizziness.     Patient able to bear weight on sx leg; equal sens

## 2017-12-20 NOTE — PLAN OF CARE
Diabetes/Glucose Control    • Glucose maintained within prescribed range Adequate for Discharge        DISCHARGE PLANNING    • Discharge to home or other facility with appropriate resources Adequate for Discharge        Impaired Functional Mobility    • Ac

## 2017-12-20 NOTE — PHYSICAL THERAPY NOTE
PHYSICAL THERAPY KNEE TREATMENT NOTE - INPATIENT     Room Number: 353/353-A     Session: 3  Number of Visits to Meet Established Goals: 3    Presenting Problem: s/p left TKA 12/18/17  History related to current admission: pt admitted 12/18/17 for elective mechanics;Breathing techniques;Repositioning    BALANCE  Static Sitting: Good  Dynamic Sitting: Good  Static Standing: Fair -  Dynamic Standing: Fair -    ACTIVITY TOLERANCE  Room air  No shortness of breath    AM-PAC '6-Clicks' INPATIENT SHORT FORM - BASI slides 20 reps    Saq 20 reps    SLR 20 reps    Sitting Knee Flexion 20 reps    Standing heel/toe raises 20 reps    Standing knee flexion 20 reps    Extension stretch  1x      Comments: Pt participated in group session, tolerance was good.    was prese

## 2017-12-21 NOTE — CM/SW NOTE
12/21/17 1600   Discharge disposition   Discharged to: Home-Health   Name of Nazario Proc. Scott Salvatore 1 services after discharge Skilled home care   Discharge transportation Private car

## 2017-12-21 NOTE — DISCHARGE SUMMARY
Discharge Summary  Patient ID:  Rebecca Sandoval  [id number]  87 year old  5/6/1970    Admit date: 12/18/2017    Discharge date and time: 12/20/17    Attending Physician: No att. providers found     Reason for admission: left knee osteoarthritis    Discha Historical    MetFORMIN HCl 1000 MG Oral Tab  Take 1 tablet (1,000 mg total) by mouth 2 (two) times daily with meals. , Normal, Disp-180 tablet, R-0    Lisinopril-Hydrochlorothiazide 20-25 MG Oral Tab  Take 1 tablet by mouth once daily. , Normal, Disp-90 tab

## 2017-12-22 NOTE — PAYOR COMM NOTE
ATTENTION ESME  --------------  DISCHARGE REVIEW    Payor: ALL SAVERS  Subscriber #:  [de-identified]  Authorization Number: [id number]    Admit date: 12/18/17  Admit time:  1252  Discharge Date: 12/20/2017  1:55 PM       Discharge Summary  Patient ID:  Timothy

## 2017-12-23 NOTE — PLAN OF CARE
DISCHARGE PLANNING    • Discharge to home or other facility with appropriate resources Progressing        PAIN - ADULT    • Verbalizes/displays adequate comfort level or patient's stated pain goal Progressing        SAFETY ADULT - FALL    • Free from fall Stable

## 2017-12-27 ENCOUNTER — OFFICE VISIT (OUTPATIENT)
Dept: INTERNAL MEDICINE CLINIC | Facility: CLINIC | Age: 47
End: 2017-12-27

## 2017-12-27 VITALS
TEMPERATURE: 98 F | SYSTOLIC BLOOD PRESSURE: 132 MMHG | WEIGHT: 251 LBS | HEIGHT: 70 IN | DIASTOLIC BLOOD PRESSURE: 84 MMHG | BODY MASS INDEX: 35.93 KG/M2 | RESPIRATION RATE: 16 BRPM | HEART RATE: 72 BPM

## 2017-12-27 DIAGNOSIS — E11.9 TYPE 2 DIABETES MELLITUS WITHOUT COMPLICATION, WITHOUT LONG-TERM CURRENT USE OF INSULIN (HCC): ICD-10-CM

## 2017-12-27 DIAGNOSIS — Z96.652 STATUS POST TOTAL LEFT KNEE REPLACEMENT: Primary | ICD-10-CM

## 2017-12-27 PROCEDURE — 99214 OFFICE O/P EST MOD 30 MIN: CPT | Performed by: FAMILY MEDICINE

## 2017-12-27 NOTE — PROGRESS NOTES
HPI:    Patient ID: Abi Batres is a 49year old male. HPI Here for f/u from left total knee replacement with Dr. Bren Novoa on 12/18/17. Patient is taking DVT prophylaxis, oxycodone, vistaril as prescribed with no issues.  Has been taking stool softener Current Outpatient Prescriptions:  gabapentin 300 MG Oral Cap Take 600 mg by mouth 3 (three) times daily. Disp:  Rfl:    Empagliflozin (JARDIANCE) 10 MG Oral Tab Take 10 mg by mouth.  Disp:  Rfl:    oxyCODONE-acetaminophen 5-325 MG Oral Tab Take 1 tablet by Mild soft tissue swelling surrounding left knee with posterior bruising. Staples intact and wound closed, no surrounding erythema, no discharge, no warmth. Psychiatric: He has a normal mood and affect. His behavior is normal.   Vitals reviewed.

## 2017-12-31 DIAGNOSIS — E11.9 TYPE 2 DIABETES MELLITUS WITHOUT COMPLICATION, WITHOUT LONG-TERM CURRENT USE OF INSULIN (HCC): ICD-10-CM

## 2017-12-31 DIAGNOSIS — I10 ESSENTIAL HYPERTENSION: ICD-10-CM

## 2018-01-02 RX ORDER — LISINOPRIL AND HYDROCHLOROTHIAZIDE 25; 20 MG/1; MG/1
TABLET ORAL
Qty: 90 TABLET | Refills: 0 | Status: SHIPPED | OUTPATIENT
Start: 2018-01-02 | End: 2018-04-19

## 2018-01-02 NOTE — TELEPHONE ENCOUNTER
E request  Medication(s) to Refill:   Pending Prescriptions Disp Refills    LISINOPRIL-HYDROCHLOROTHIAZIDE 20-25 MG Oral Tab [Pharmacy Med Name: LISINOPRIL-HCTZ 20/25MG TABLETS] 90 tablet 0     Sig: TAKE 1 TABLET BY MOUTH EVERY DAY           Last Time Medi

## 2018-01-03 PROBLEM — Z96.652 STATUS POST TOTAL LEFT KNEE REPLACEMENT: Status: ACTIVE | Noted: 2018-01-03

## 2018-01-08 RX ORDER — EMPAGLIFLOZIN 10 MG/1
1 TABLET, FILM COATED ORAL DAILY
Qty: 90 TABLET | Refills: 0 | Status: SHIPPED | OUTPATIENT
Start: 2018-01-08 | End: 2018-04-19

## 2018-01-08 NOTE — TELEPHONE ENCOUNTER
E request  Medication(s) to Refill:   Pending Prescriptions Disp Refills    JARDIANCE 10 MG Oral Tab [Pharmacy Med Name: JARDIANCE 10MG TABLETS] 30 tablet 0     Sig: TAKE 1 TABLET BY MOUTH DAILY           Last Time Medication was Filled:  historical     [place]

## 2018-01-18 NOTE — TELEPHONE ENCOUNTER
Medication(s) to Refill:   Pending Prescriptions Disp Refills    METFORMIN HCL 1000 MG Oral Tab [Pharmacy Med Name: METFORMIN 1000MG TABLETS] 180 tablet 0     Sig: TAKE 1 TABLET(1000 MG) BY MOUTH TWICE DAILY WITH MEALS           Last Time Medication was Fi has never been prescribed by [facility] provider   [] Other     Last Blood Pressures:  BP Readings from Last 2 Encounters:  12/27/17 : 132/84  12/20/17 : 142/83

## 2018-02-05 RX ORDER — ASPIRIN 81 MG
TABLET,CHEWABLE ORAL
Refills: 4 | OUTPATIENT
Start: 2018-02-05

## 2018-02-05 RX ORDER — ASPIRIN 81 MG
TABLET,CHEWABLE ORAL
Refills: 4 | COMMUNITY
Start: 2017-12-31 | End: 2020-03-04

## 2018-02-21 ENCOUNTER — PATIENT MESSAGE (OUTPATIENT)
Dept: INTERNAL MEDICINE CLINIC | Facility: CLINIC | Age: 48
End: 2018-02-21

## 2018-02-21 DIAGNOSIS — E55.9 VITAMIN D DEFICIENCY: ICD-10-CM

## 2018-02-21 DIAGNOSIS — E11.9 TYPE 2 DIABETES MELLITUS WITHOUT COMPLICATION, WITHOUT LONG-TERM CURRENT USE OF INSULIN (HCC): Primary | ICD-10-CM

## 2018-02-21 NOTE — TELEPHONE ENCOUNTER
From: Abi Batres  To: Tere Escobedo DO  Sent: 2/21/2018 2:27 PM CST  Subject: Visit Follow-up Question    Good afternoon,  It has been two months since my knee replacement surgery and I am still having problems sleeping at night.  On average I am

## 2018-02-22 NOTE — TELEPHONE ENCOUNTER
Yes, please message patient to make f/u office visit to discuss this. He can either come in a few days before and do fasting labs or he can schedule his office visit and come in fasting then and we can draw him that day. Either way, orders are in.  Thank yo

## 2018-02-23 ENCOUNTER — APPOINTMENT (OUTPATIENT)
Dept: LAB | Age: 48
End: 2018-02-23
Attending: FAMILY MEDICINE
Payer: COMMERCIAL

## 2018-02-23 DIAGNOSIS — E55.9 VITAMIN D DEFICIENCY: ICD-10-CM

## 2018-02-23 DIAGNOSIS — E11.9 TYPE 2 DIABETES MELLITUS WITHOUT COMPLICATION, WITHOUT LONG-TERM CURRENT USE OF INSULIN (HCC): ICD-10-CM

## 2018-02-23 LAB
25-HYDROXYVITAMIN D (TOTAL): 20.7 NG/ML (ref 30–100)
ALBUMIN SERPL-MCNC: 4 G/DL (ref 3.5–4.8)
ALP LIVER SERPL-CCNC: 43 U/L (ref 45–117)
ALT SERPL-CCNC: 13 U/L (ref 17–63)
AST SERPL-CCNC: 9 U/L (ref 15–41)
BILIRUB SERPL-MCNC: 0.7 MG/DL (ref 0.1–2)
BUN BLD-MCNC: 23 MG/DL (ref 8–20)
CALCIUM BLD-MCNC: 8.9 MG/DL (ref 8.3–10.3)
CHLORIDE: 103 MMOL/L (ref 101–111)
CHOLEST SMN-MCNC: 181 MG/DL (ref ?–200)
CO2: 24 MMOL/L (ref 22–32)
CREAT BLD-MCNC: 1.15 MG/DL (ref 0.7–1.3)
CREAT UR-SCNC: 182 MG/DL
EST. AVERAGE GLUCOSE BLD GHB EST-MCNC: 137 MG/DL (ref 68–126)
GLUCOSE BLD-MCNC: 152 MG/DL (ref 70–99)
HBA1C MFR BLD HPLC: 6.4 % (ref ?–5.7)
HDLC SERPL-MCNC: 36 MG/DL (ref 45–?)
HDLC SERPL: 5.03 {RATIO} (ref ?–4.97)
LDLC SERPL CALC-MCNC: 115 MG/DL (ref ?–130)
M PROTEIN MFR SERPL ELPH: 7 G/DL (ref 6.1–8.3)
MICROALBUMIN UR-MCNC: 1.73 MG/DL
MICROALBUMIN/CREAT 24H UR-RTO: 9.5 UG/MG (ref ?–30)
NONHDLC SERPL-MCNC: 145 MG/DL (ref ?–130)
POTASSIUM SERPL-SCNC: 3.5 MMOL/L (ref 3.6–5.1)
SODIUM SERPL-SCNC: 138 MMOL/L (ref 136–144)
TRIGL SERPL-MCNC: 152 MG/DL (ref ?–150)
VLDLC SERPL CALC-MCNC: 30 MG/DL (ref 5–40)

## 2018-02-23 PROCEDURE — 82306 VITAMIN D 25 HYDROXY: CPT

## 2018-02-23 PROCEDURE — 83036 HEMOGLOBIN GLYCOSYLATED A1C: CPT

## 2018-02-23 PROCEDURE — 80053 COMPREHEN METABOLIC PANEL: CPT

## 2018-02-23 PROCEDURE — 80061 LIPID PANEL: CPT

## 2018-02-23 PROCEDURE — 82043 UR ALBUMIN QUANTITATIVE: CPT

## 2018-02-23 PROCEDURE — 82570 ASSAY OF URINE CREATININE: CPT

## 2018-02-27 ENCOUNTER — LAB ENCOUNTER (OUTPATIENT)
Dept: LAB | Age: 48
End: 2018-02-27
Attending: FAMILY MEDICINE
Payer: COMMERCIAL

## 2018-02-27 ENCOUNTER — PATIENT MESSAGE (OUTPATIENT)
Dept: INTERNAL MEDICINE CLINIC | Facility: CLINIC | Age: 48
End: 2018-02-27

## 2018-02-27 ENCOUNTER — OFFICE VISIT (OUTPATIENT)
Dept: INTERNAL MEDICINE CLINIC | Facility: CLINIC | Age: 48
End: 2018-02-27

## 2018-02-27 VITALS
SYSTOLIC BLOOD PRESSURE: 100 MMHG | WEIGHT: 240 LBS | BODY MASS INDEX: 34 KG/M2 | HEART RATE: 72 BPM | RESPIRATION RATE: 16 BRPM | TEMPERATURE: 98 F | DIASTOLIC BLOOD PRESSURE: 80 MMHG

## 2018-02-27 DIAGNOSIS — E11.9 TYPE 2 DIABETES MELLITUS WITHOUT COMPLICATION, WITHOUT LONG-TERM CURRENT USE OF INSULIN (HCC): Primary | ICD-10-CM

## 2018-02-27 DIAGNOSIS — Z13.0 SCREENING FOR DEFICIENCY ANEMIA: ICD-10-CM

## 2018-02-27 DIAGNOSIS — E55.9 VITAMIN D DEFICIENCY: ICD-10-CM

## 2018-02-27 DIAGNOSIS — I10 ESSENTIAL HYPERTENSION: ICD-10-CM

## 2018-02-27 DIAGNOSIS — G47.00 INSOMNIA, UNSPECIFIED TYPE: ICD-10-CM

## 2018-02-27 DIAGNOSIS — Z96.652 STATUS POST TOTAL LEFT KNEE REPLACEMENT: ICD-10-CM

## 2018-02-27 LAB
BASOPHILS # BLD AUTO: 0.05 X10(3) UL (ref 0–0.1)
BASOPHILS NFR BLD AUTO: 0.8 %
EOSINOPHIL # BLD AUTO: 0.29 X10(3) UL (ref 0–0.3)
EOSINOPHIL NFR BLD AUTO: 4.5 %
ERYTHROCYTE [DISTWIDTH] IN BLOOD BY AUTOMATED COUNT: 12.5 % (ref 11.5–16)
HCT VFR BLD AUTO: 45.1 % (ref 37–53)
HGB BLD-MCNC: 15.4 G/DL (ref 13–17)
IMMATURE GRANULOCYTE COUNT: 0.01 X10(3) UL (ref 0–1)
IMMATURE GRANULOCYTE RATIO %: 0.2 %
LYMPHOCYTES # BLD AUTO: 1.96 X10(3) UL (ref 0.9–4)
LYMPHOCYTES NFR BLD AUTO: 30.6 %
MCH RBC QN AUTO: 29.4 PG (ref 27–33.2)
MCHC RBC AUTO-ENTMCNC: 34.1 G/DL (ref 31–37)
MCV RBC AUTO: 86.1 FL (ref 80–99)
MONOCYTES # BLD AUTO: 0.54 X10(3) UL (ref 0.1–1)
MONOCYTES NFR BLD AUTO: 8.4 %
NEUTROPHIL ABS PRELIM: 3.55 X10 (3) UL (ref 1.3–6.7)
NEUTROPHILS # BLD AUTO: 3.55 X10(3) UL (ref 1.3–6.7)
NEUTROPHILS NFR BLD AUTO: 55.5 %
PLATELET # BLD AUTO: 262 10(3)UL (ref 150–450)
RBC # BLD AUTO: 5.24 X10(6)UL (ref 4.3–5.7)
RED CELL DISTRIBUTION WIDTH-SD: 39.1 FL (ref 35.1–46.3)
WBC # BLD AUTO: 6.4 X10(3) UL (ref 4–13)

## 2018-02-27 PROCEDURE — 85025 COMPLETE CBC W/AUTO DIFF WBC: CPT | Performed by: FAMILY MEDICINE

## 2018-02-27 PROCEDURE — 99214 OFFICE O/P EST MOD 30 MIN: CPT | Performed by: FAMILY MEDICINE

## 2018-02-27 NOTE — PATIENT INSTRUCTIONS
Prevention Guidelines, Men Ages 36 to 52  Screening tests and vaccines are an important part of managing your health. Health counseling is essential, too. Below are guidelines for these, for men ages 36 to 52.  Talk with your healthcare provider to make s Chickenpox (varicella) All men in this age group who have no record of this infection or vaccine 2 doses; the second dose should be given at least 4 weeks after the first dose   Hepatitis A Men at increased risk for infection – talk with your healthcare pr Use of tobacco and the health effects it can cause All men in this age group Every exam   [facility]  Date Last Reviewed: 2/1/2017  © 7142-0020 [address].  [facility]

## 2018-02-27 NOTE — PROGRESS NOTES
HPI:    Patient ID: Danae Morales is a 49year old male. HPI Here for f/u. Is frustrated about his left knee replacement and not being at 100% yet. Had this done mid-December. Is continuing to work with PT.  Notes pain at night that wakes him up melissa and shortness of breath. Cardiovascular: Negative for chest pain and palpitations.               Current Outpatient Prescriptions:  ASPIRIN LOW DOSE 81 MG Oral Chew Tab  Disp:  Rfl: 4   METFORMIN HCL 1000 MG Oral Tab TAKE 1 TABLET(1000 MG) BY MOUTH TWICE monitoring carbs. If low sugars, patient aware we will adjust meds. Recheck A1c in 2-3 months. 3. Start vitamin D3 5000IU daily. Recheck in 2-3 months. 4. BP at goal. Continue current medication. 5. Stable. Continue PT.  Continue f/u with Ortho if nee

## 2018-02-28 NOTE — TELEPHONE ENCOUNTER
From: Clemente Evans  To: Cameron Lovell DO  Sent: 2/27/2018 3:24 PM CST  Subject: Test Results Question    Thank you for letting me know much appreciated.  I will come back in for the [facility] test in two months  Thanks  Again  nick

## 2018-04-19 DIAGNOSIS — E11.9 TYPE 2 DIABETES MELLITUS WITHOUT COMPLICATION, WITHOUT LONG-TERM CURRENT USE OF INSULIN (HCC): ICD-10-CM

## 2018-04-19 DIAGNOSIS — I10 ESSENTIAL HYPERTENSION: ICD-10-CM

## 2018-04-19 RX ORDER — EMPAGLIFLOZIN 10 MG/1
1 TABLET, FILM COATED ORAL DAILY
Qty: 90 TABLET | Refills: 0 | Status: SHIPPED | OUTPATIENT
Start: 2018-04-19 | End: 2018-08-23

## 2018-04-19 RX ORDER — LISINOPRIL AND HYDROCHLOROTHIAZIDE 25; 20 MG/1; MG/1
TABLET ORAL
Qty: 90 TABLET | Refills: 0 | Status: SHIPPED | OUTPATIENT
Start: 2018-04-19 | End: 2018-08-23

## 2018-04-19 NOTE — TELEPHONE ENCOUNTER
LOV: 2/27/18 AMS  Future office visit: No upcoming visit  Last labs: 2/23/18 U. Micro, A1C Lipid Cmp Vit D   Last RX: Jardiance 1/8/18 #90 No Refills    Lisinopril/HCTZ 1/2/18 #90 No Refills  Per protocol: Passed; routing per provider request to further rev

## 2018-05-04 PROBLEM — M54.12 CERVICAL RADICULOPATHY AT C8: Status: ACTIVE | Noted: 2018-05-04

## 2018-05-04 PROBLEM — M50.20 HNP (HERNIATED NUCLEUS PULPOSUS), CERVICAL: Status: ACTIVE | Noted: 2018-05-04

## 2018-08-16 ENCOUNTER — TELEPHONE (OUTPATIENT)
Dept: INTERNAL MEDICINE CLINIC | Facility: CLINIC | Age: 48
End: 2018-08-16

## 2018-08-16 DIAGNOSIS — E55.9 VITAMIN D DEFICIENCY: ICD-10-CM

## 2018-08-16 DIAGNOSIS — E11.9 TYPE 2 DIABETES MELLITUS WITHOUT COMPLICATION, WITHOUT LONG-TERM CURRENT USE OF INSULIN (HCC): Primary | ICD-10-CM

## 2018-08-16 NOTE — TELEPHONE ENCOUNTER
I called patient to remind him to get his annual diabetic eye exam. Pt stated he was told to come in to just get an A1C. There is an old order from Dec. 2017 for A1C.  Please clarify if patient can still use that order and if he can just get that done at th

## 2018-08-17 NOTE — TELEPHONE ENCOUNTER
Last a1c: 02/23/18     Per LOV notes from [facility] - 02/27/18-     2. Reviewed A1c is at goal. Continue current medication for now. Continue monitoring carbs. If low sugars, patient aware we will adjust meds. Recheck A1c in 2-3 months.    3. Start vitamin D3 500

## 2018-08-17 NOTE — TELEPHONE ENCOUNTER
Please call patient. I placed orders for non-fasting labs. He needs to be seen in office every 6 months at least. It has been since Feb that I saw him so he should make appt for his diabetes. He can get labs done a few days prior to me seeing him then.

## 2018-08-17 NOTE — TELEPHONE ENCOUNTER
Future Appointments  Date Time Provider Ernie Valencia   8/28/2018 8:30 AM Yarely Rhodes, DO [facility] [facility]     Pt to get labs done prior to appt

## 2018-08-22 ENCOUNTER — APPOINTMENT (OUTPATIENT)
Dept: LAB | Age: 48
End: 2018-08-22
Attending: FAMILY MEDICINE
Payer: COMMERCIAL

## 2018-08-22 DIAGNOSIS — E11.9 TYPE 2 DIABETES MELLITUS WITHOUT COMPLICATION, WITHOUT LONG-TERM CURRENT USE OF INSULIN (HCC): ICD-10-CM

## 2018-08-22 DIAGNOSIS — E55.9 VITAMIN D DEFICIENCY: ICD-10-CM

## 2018-08-22 LAB
EST. AVERAGE GLUCOSE BLD GHB EST-MCNC: 146 MG/DL (ref 68–126)
HBA1C MFR BLD HPLC: 6.7 % (ref ?–5.7)
VIT D+METAB SERPL-MCNC: 18.2 NG/ML (ref 30–100)

## 2018-08-22 PROCEDURE — 83036 HEMOGLOBIN GLYCOSYLATED A1C: CPT | Performed by: FAMILY MEDICINE

## 2018-08-22 PROCEDURE — 82306 VITAMIN D 25 HYDROXY: CPT | Performed by: FAMILY MEDICINE

## 2018-08-23 DIAGNOSIS — E11.9 TYPE 2 DIABETES MELLITUS WITHOUT COMPLICATION, WITHOUT LONG-TERM CURRENT USE OF INSULIN (HCC): ICD-10-CM

## 2018-08-23 DIAGNOSIS — I10 ESSENTIAL HYPERTENSION: ICD-10-CM

## 2018-08-23 RX ORDER — LISINOPRIL AND HYDROCHLOROTHIAZIDE 25; 20 MG/1; MG/1
TABLET ORAL
Qty: 90 TABLET | Refills: 0 | Status: SHIPPED | OUTPATIENT
Start: 2018-08-23 | End: 2019-01-06

## 2018-08-23 RX ORDER — EMPAGLIFLOZIN 10 MG/1
1 TABLET, FILM COATED ORAL DAILY
Qty: 90 TABLET | Refills: 0 | Status: SHIPPED | OUTPATIENT
Start: 2018-08-23 | End: 2019-01-06

## 2018-08-23 NOTE — TELEPHONE ENCOUNTER
E request  Medication(s) to Refill:   Pending Prescriptions Disp Refills    LISINOPRIL-HYDROCHLOROTHIAZIDE 20-25 MG Oral Tab [Pharmacy Med Name: LISINOPRIL-HCTZ 20/25MG TABLETS] 90 tablet 0     Sig: TAKE 1 TABLET BY MOUTH EVERY DAY      JARDIANCE 10 MG Ora

## 2018-08-28 ENCOUNTER — OFFICE VISIT (OUTPATIENT)
Dept: INTERNAL MEDICINE CLINIC | Facility: CLINIC | Age: 48
End: 2018-08-28
Payer: COMMERCIAL

## 2018-08-28 VITALS
TEMPERATURE: 98 F | WEIGHT: 241 LBS | HEART RATE: 70 BPM | BODY MASS INDEX: 35 KG/M2 | RESPIRATION RATE: 16 BRPM | DIASTOLIC BLOOD PRESSURE: 60 MMHG | SYSTOLIC BLOOD PRESSURE: 100 MMHG

## 2018-08-28 DIAGNOSIS — E55.9 VITAMIN D DEFICIENCY: ICD-10-CM

## 2018-08-28 DIAGNOSIS — E11.9 TYPE 2 DIABETES MELLITUS WITHOUT COMPLICATION, WITHOUT LONG-TERM CURRENT USE OF INSULIN (HCC): Primary | ICD-10-CM

## 2018-08-28 DIAGNOSIS — I10 ESSENTIAL HYPERTENSION: ICD-10-CM

## 2018-08-28 PROCEDURE — 99214 OFFICE O/P EST MOD 30 MIN: CPT | Performed by: FAMILY MEDICINE

## 2018-08-28 PROCEDURE — 90732 PPSV23 VACC 2 YRS+ SUBQ/IM: CPT | Performed by: FAMILY MEDICINE

## 2018-08-28 PROCEDURE — 90471 IMMUNIZATION ADMIN: CPT | Performed by: FAMILY MEDICINE

## 2018-08-28 NOTE — PATIENT INSTRUCTIONS
Diabetes and Your Child: Understanding Type 2 Diabetes  Marin Lanierwayne been told that your child has type 2 diabetes. This means his or her body has trouble using a sugar called glucose for energy. Diabetes is a lifelong condition.  Left untreated, it can lead to With type 2 diabetes, food is still broken down into glucose. And glucose still travels to the cells. But the pancreas may not make enough insulin for the amount of glucose in the blood. The liver may release too much glucose at once.  And the body’s cells Simple blood tests can help the healthcare provider determine if your child has type 2 diabetes. These tests check for a high level of glucose in your child’s blood. Glucose tests may need to be repeated in order to confirm the diagnosis.   How is type 2 di

## 2018-08-28 NOTE — PROGRESS NOTES
HPI:    Patient ID: Evie Downs is a 51year old male. HPI Here for f/u. Patient has no complaints. Feels great. Taking diabetes meds as prescribed with no issues. No low sugar symptoms. Checks sugars about once per week.  Aware he is due for eye Rfl: 4   gabapentin 300 MG Oral Cap Take 600 mg by mouth 3 (three) times daily. Disp:  Rfl:    rivaroxaban 10 MG Oral Tab Take 1 tablet (10 mg total) by mouth daily.  Disp: 12 tablet Rfl: 0   Glucose Blood (ACCU-CHEK SALO PLUS) In Vitro Strip 1 each by [employer]

## 2018-09-20 ENCOUNTER — TELEPHONE (OUTPATIENT)
Dept: INTERNAL MEDICINE CLINIC | Facility: CLINIC | Age: 48
End: 2018-09-20

## 2018-09-20 NOTE — TELEPHONE ENCOUNTER
Spoke to patient to remind him to schedule his annual diabetic eye exam. Pt states he will schedule it in the next two weeks. He is aware to have the specialist office fax us a copy for his chart.

## 2018-10-22 NOTE — TELEPHONE ENCOUNTER
LOV: 8/28/18 w/ AMS  FOV: 2/26/19 CPE  Last labs: 8/22/18 VIT D,A1C  Last Refill: 5/27/18 qt:180    Per protocol sent to provider

## 2019-01-06 DIAGNOSIS — I10 ESSENTIAL HYPERTENSION: ICD-10-CM

## 2019-01-06 DIAGNOSIS — E11.9 TYPE 2 DIABETES MELLITUS WITHOUT COMPLICATION, WITHOUT LONG-TERM CURRENT USE OF INSULIN (HCC): ICD-10-CM

## 2019-01-07 RX ORDER — EMPAGLIFLOZIN 10 MG/1
1 TABLET, FILM COATED ORAL DAILY
Qty: 90 TABLET | Refills: 0 | Status: SHIPPED | OUTPATIENT
Start: 2019-01-07 | End: 2019-03-27

## 2019-01-07 RX ORDER — LISINOPRIL AND HYDROCHLOROTHIAZIDE 25; 20 MG/1; MG/1
TABLET ORAL
Qty: 90 TABLET | Refills: 0 | Status: SHIPPED | OUTPATIENT
Start: 2019-01-07 | End: 2019-03-27

## 2019-01-07 NOTE — TELEPHONE ENCOUNTER
E request  LOV: 8/28/18- diabetes f/u  Last labs: 8/22/18- 6.7 a1c     2/23/18  Last rx: Jardiance: historical   Last rx Lisinopril: 8/23/18- 90 tablets with 0 refills  Future Appointments   Date Time Provider Ernie Valencia   2/26/2019  7:30 AM Paul

## 2019-02-12 ENCOUNTER — TELEPHONE (OUTPATIENT)
Dept: INTERNAL MEDICINE CLINIC | Facility: CLINIC | Age: 49
End: 2019-02-12

## 2019-02-12 NOTE — TELEPHONE ENCOUNTER
DM eye exam received from [facility]. Results abstracted and placed in AMS bin for review.

## 2019-02-15 ENCOUNTER — TELEPHONE (OUTPATIENT)
Dept: INTERNAL MEDICINE CLINIC | Facility: CLINIC | Age: 49
End: 2019-02-15

## 2019-02-15 DIAGNOSIS — E11.9 TYPE 2 DIABETES MELLITUS WITHOUT COMPLICATION, WITHOUT LONG-TERM CURRENT USE OF INSULIN (HCC): ICD-10-CM

## 2019-02-15 DIAGNOSIS — E55.9 VITAMIN D DEFICIENCY: ICD-10-CM

## 2019-02-15 DIAGNOSIS — Z00.00 ROUTINE GENERAL MEDICAL EXAMINATION AT A HEALTH CARE FACILITY: Primary | ICD-10-CM

## 2019-02-15 NOTE — TELEPHONE ENCOUNTER
Future Appointments   Date Time Provider Ernie Valencia   2/26/2019  7:30 AM Reshma Garcia,  [facility]     Pt would like BW orders sent to THE [facility] pls.  Pt aware to fast.

## 2019-02-19 ENCOUNTER — LAB ENCOUNTER (OUTPATIENT)
Dept: LAB | Age: 49
End: 2019-02-19
Attending: FAMILY MEDICINE
Payer: COMMERCIAL

## 2019-02-19 DIAGNOSIS — E55.9 VITAMIN D DEFICIENCY: ICD-10-CM

## 2019-02-19 DIAGNOSIS — E11.9 TYPE 2 DIABETES MELLITUS WITHOUT COMPLICATION, WITHOUT LONG-TERM CURRENT USE OF INSULIN (HCC): ICD-10-CM

## 2019-02-19 DIAGNOSIS — Z00.00 ROUTINE GENERAL MEDICAL EXAMINATION AT A HEALTH CARE FACILITY: ICD-10-CM

## 2019-02-19 LAB
ALBUMIN SERPL-MCNC: 3.9 G/DL (ref 3.4–5)
ALBUMIN/GLOB SERPL: 1.3 {RATIO} (ref 1–2)
ALP LIVER SERPL-CCNC: 42 U/L (ref 45–117)
ALT SERPL-CCNC: 20 U/L (ref 16–61)
ANION GAP SERPL CALC-SCNC: 8 MMOL/L (ref 0–18)
AST SERPL-CCNC: 15 U/L (ref 15–37)
BASOPHILS # BLD AUTO: 0.06 X10(3) UL (ref 0–0.2)
BASOPHILS NFR BLD AUTO: 0.9 %
BILIRUB SERPL-MCNC: 0.5 MG/DL (ref 0.1–2)
BUN BLD-MCNC: 23 MG/DL (ref 7–18)
BUN/CREAT SERPL: 20.2 (ref 10–20)
CALCIUM BLD-MCNC: 8.5 MG/DL (ref 8.5–10.1)
CHLORIDE SERPL-SCNC: 109 MMOL/L (ref 98–107)
CHOLEST SMN-MCNC: 180 MG/DL (ref ?–200)
CO2 SERPL-SCNC: 25 MMOL/L (ref 21–32)
CREAT BLD-MCNC: 1.14 MG/DL (ref 0.7–1.3)
CREAT UR-SCNC: 218 MG/DL
DEPRECATED RDW RBC AUTO: 41.5 FL (ref 35.1–46.3)
EOSINOPHIL # BLD AUTO: 0.26 X10(3) UL (ref 0–0.7)
EOSINOPHIL NFR BLD AUTO: 3.9 %
ERYTHROCYTE [DISTWIDTH] IN BLOOD BY AUTOMATED COUNT: 12.6 % (ref 11–15)
EST. AVERAGE GLUCOSE BLD GHB EST-MCNC: 160 MG/DL (ref 68–126)
GLOBULIN PLAS-MCNC: 3.1 G/DL (ref 2.8–4.4)
GLUCOSE BLD-MCNC: 155 MG/DL (ref 70–99)
HBA1C MFR BLD HPLC: 7.2 % (ref ?–5.7)
HCT VFR BLD AUTO: 49.2 % (ref 39–53)
HDLC SERPL-MCNC: 34 MG/DL (ref 40–59)
HGB BLD-MCNC: 16.4 G/DL (ref 13–17.5)
IMM GRANULOCYTES # BLD AUTO: 0.03 X10(3) UL (ref 0–1)
IMM GRANULOCYTES NFR BLD: 0.4 %
LDLC SERPL CALC-MCNC: 106 MG/DL (ref ?–100)
LYMPHOCYTES # BLD AUTO: 1.91 X10(3) UL (ref 1–4)
LYMPHOCYTES NFR BLD AUTO: 28.5 %
M PROTEIN MFR SERPL ELPH: 7 G/DL (ref 6.4–8.2)
MCH RBC QN AUTO: 30.4 PG (ref 26–34)
MCHC RBC AUTO-ENTMCNC: 33.3 G/DL (ref 31–37)
MCV RBC AUTO: 91.3 FL (ref 80–100)
MICROALBUMIN UR-MCNC: 1.45 MG/DL
MICROALBUMIN/CREAT 24H UR-RTO: 6.7 UG/MG (ref ?–30)
MONOCYTES # BLD AUTO: 0.55 X10(3) UL (ref 0.1–1)
MONOCYTES NFR BLD AUTO: 8.2 %
NEUTROPHILS # BLD AUTO: 3.9 X10 (3) UL (ref 1.5–7.7)
NEUTROPHILS # BLD AUTO: 3.9 X10(3) UL (ref 1.5–7.7)
NEUTROPHILS NFR BLD AUTO: 58.1 %
NONHDLC SERPL-MCNC: 146 MG/DL (ref ?–130)
OSMOLALITY SERPL CALC.SUM OF ELEC: 301 MOSM/KG (ref 275–295)
PLATELET # BLD AUTO: 246 10(3)UL (ref 150–450)
POTASSIUM SERPL-SCNC: 4.1 MMOL/L (ref 3.5–5.1)
RBC # BLD AUTO: 5.39 X10(6)UL (ref 4.3–5.7)
SODIUM SERPL-SCNC: 142 MMOL/L (ref 136–145)
TRIGL SERPL-MCNC: 198 MG/DL (ref 30–149)
VIT D+METAB SERPL-MCNC: 18 NG/ML (ref 30–100)
VLDLC SERPL CALC-MCNC: 40 MG/DL (ref 0–30)
WBC # BLD AUTO: 6.7 X10(3) UL (ref 4–11)

## 2019-02-19 PROCEDURE — 82043 UR ALBUMIN QUANTITATIVE: CPT | Performed by: FAMILY MEDICINE

## 2019-02-19 PROCEDURE — 80053 COMPREHEN METABOLIC PANEL: CPT | Performed by: FAMILY MEDICINE

## 2019-02-19 PROCEDURE — 85025 COMPLETE CBC W/AUTO DIFF WBC: CPT | Performed by: FAMILY MEDICINE

## 2019-02-19 PROCEDURE — 82306 VITAMIN D 25 HYDROXY: CPT | Performed by: FAMILY MEDICINE

## 2019-02-19 PROCEDURE — 80061 LIPID PANEL: CPT | Performed by: FAMILY MEDICINE

## 2019-02-19 PROCEDURE — 82570 ASSAY OF URINE CREATININE: CPT | Performed by: FAMILY MEDICINE

## 2019-02-19 PROCEDURE — 83036 HEMOGLOBIN GLYCOSYLATED A1C: CPT | Performed by: FAMILY MEDICINE

## 2019-02-26 ENCOUNTER — OFFICE VISIT (OUTPATIENT)
Dept: INTERNAL MEDICINE CLINIC | Facility: CLINIC | Age: 49
End: 2019-02-26
Payer: COMMERCIAL

## 2019-02-26 VITALS
BODY MASS INDEX: 35.36 KG/M2 | OXYGEN SATURATION: 99 % | HEART RATE: 78 BPM | DIASTOLIC BLOOD PRESSURE: 80 MMHG | SYSTOLIC BLOOD PRESSURE: 126 MMHG | RESPIRATION RATE: 16 BRPM | HEIGHT: 70 IN | TEMPERATURE: 97 F | WEIGHT: 247 LBS

## 2019-02-26 DIAGNOSIS — E78.5 HYPERLIPIDEMIA, UNSPECIFIED HYPERLIPIDEMIA TYPE: ICD-10-CM

## 2019-02-26 DIAGNOSIS — I10 ESSENTIAL HYPERTENSION: ICD-10-CM

## 2019-02-26 DIAGNOSIS — Z12.5 SCREENING PSA (PROSTATE SPECIFIC ANTIGEN): ICD-10-CM

## 2019-02-26 DIAGNOSIS — Z00.00 ANNUAL PHYSICAL EXAM: Primary | ICD-10-CM

## 2019-02-26 DIAGNOSIS — E11.9 TYPE 2 DIABETES MELLITUS WITHOUT COMPLICATION, WITHOUT LONG-TERM CURRENT USE OF INSULIN (HCC): ICD-10-CM

## 2019-02-26 DIAGNOSIS — Z12.11 SCREENING FOR MALIGNANT NEOPLASM OF COLON: ICD-10-CM

## 2019-02-26 PROCEDURE — 99213 OFFICE O/P EST LOW 20 MIN: CPT | Performed by: FAMILY MEDICINE

## 2019-02-26 PROCEDURE — 90686 IIV4 VACC NO PRSV 0.5 ML IM: CPT | Performed by: FAMILY MEDICINE

## 2019-02-26 PROCEDURE — 90471 IMMUNIZATION ADMIN: CPT | Performed by: FAMILY MEDICINE

## 2019-02-26 PROCEDURE — 99396 PREV VISIT EST AGE 40-64: CPT | Performed by: FAMILY MEDICINE

## 2019-02-26 RX ORDER — ATORVASTATIN CALCIUM 10 MG/1
10 TABLET, FILM COATED ORAL NIGHTLY
Qty: 90 TABLET | Refills: 0 | Status: SHIPPED | OUTPATIENT
Start: 2019-02-26 | End: 2019-05-22

## 2019-02-26 NOTE — PATIENT INSTRUCTIONS
Start vitamin D3 5000IU daily. Stop potassium to see if this was helping with cramps or not. Continue magnesium. Start Atorvastatin 10mg nightly. We will recheck your cholesterol in 3 months. Restart Aspirin 81mg daily.    We are going to check the All men At routine exams   Obesity All men in this age group At routine exams   Prostate cancer Starting at age 39, talk to healthcare provider about risks and benefits of digital rectal exam (LEXY) and prostate-specific antigen (PSA) screening1 At routine pneumococcal bacteria)      Tetanus/diphtheria/  pertussis (Td/Tdap) booster All men in this age group Td every 10 years, or a one-time dose of Tdap instead of a Td booster after age 25, then Td every 10 years   Counseling Who needs it How often   Diet and

## 2019-02-28 PROBLEM — Z96.652 STATUS POST TOTAL LEFT KNEE REPLACEMENT: Status: RESOLVED | Noted: 2018-01-03 | Resolved: 2019-02-28

## 2019-02-28 NOTE — PROGRESS NOTES
HPI:    Patient ID: Gwendolyn Samuel is a 51year old male. HPI Here for annual check-up. Patient has no complaints. Taking all meds as prescribed with no issues. Checks sugars and has no low sugars.      Past Medical History:   Diagnosis Date   • Acut disturbance. Respiratory: Negative for choking, shortness of breath and wheezing. Cardiovascular: Negative for chest pain, palpitations and leg swelling. Gastrointestinal: Negative for abdominal pain, constipation, diarrhea, nausea and vomiting.    E Normocephalic and atraumatic.    Right Ear: Tympanic membrane, external ear and ear canal normal.   Left Ear: Tympanic membrane, external ear and ear canal normal.   Mouth/Throat: Oropharynx is clear and moist and mucous membranes are normal. No posterior o Referrals:  FLULAVAL INFLUENZA VACCINE QUAD PRESERVATIVE FREE 0.5 ML  GASTRO - INTERNAL       NF#[id number]

## 2019-03-27 DIAGNOSIS — E11.9 TYPE 2 DIABETES MELLITUS WITHOUT COMPLICATION, WITHOUT LONG-TERM CURRENT USE OF INSULIN (HCC): ICD-10-CM

## 2019-03-27 DIAGNOSIS — I10 ESSENTIAL HYPERTENSION: ICD-10-CM

## 2019-03-27 RX ORDER — EMPAGLIFLOZIN 10 MG/1
1 TABLET, FILM COATED ORAL DAILY
Qty: 90 TABLET | Refills: 0 | Status: SHIPPED | OUTPATIENT
Start: 2019-03-27 | End: 2019-07-04

## 2019-03-27 RX ORDER — LISINOPRIL AND HYDROCHLOROTHIAZIDE 25; 20 MG/1; MG/1
TABLET ORAL
Qty: 90 TABLET | Refills: 0 | Status: SHIPPED | OUTPATIENT
Start: 2019-03-27 | End: 2019-07-15

## 2019-03-27 NOTE — TELEPHONE ENCOUNTER
LOV: 2/26/19  Future Visit:  None  Last Rx: Lisinopril  1/7/19 90 tabs 0 refills  Metformin 2/4/19 180 tabs 0 refills  jardiance 1/7/19 90 tabs 0 refills  Last Labs:  2/19/19  Per protocol : to provider

## 2019-05-01 ENCOUNTER — APPOINTMENT (OUTPATIENT)
Dept: LAB | Age: 49
End: 2019-05-01
Attending: FAMILY MEDICINE
Payer: COMMERCIAL

## 2019-05-01 DIAGNOSIS — E11.9 TYPE 2 DIABETES MELLITUS WITHOUT COMPLICATION, WITHOUT LONG-TERM CURRENT USE OF INSULIN (HCC): ICD-10-CM

## 2019-05-01 DIAGNOSIS — Z12.5 SCREENING PSA (PROSTATE SPECIFIC ANTIGEN): ICD-10-CM

## 2019-05-01 PROCEDURE — 80053 COMPREHEN METABOLIC PANEL: CPT | Performed by: FAMILY MEDICINE

## 2019-05-01 PROCEDURE — 80061 LIPID PANEL: CPT | Performed by: FAMILY MEDICINE

## 2019-05-01 PROCEDURE — 84153 ASSAY OF PSA TOTAL: CPT | Performed by: FAMILY MEDICINE

## 2019-05-23 RX ORDER — ATORVASTATIN CALCIUM 10 MG/1
TABLET, FILM COATED ORAL
Qty: 90 TABLET | Refills: 3 | Status: SHIPPED | OUTPATIENT
Start: 2019-05-23 | End: 2020-05-18

## 2019-05-23 NOTE — TELEPHONE ENCOUNTER
LOV: 2/26/19 w/ AMS for CPE  FOV: None  Last labs: 5/1/19 PSA,LIPID,CMP  Last Refill: 2/26/19 qt:90    Per protocol routed to provider

## 2019-07-05 RX ORDER — EMPAGLIFLOZIN 10 MG/1
1 TABLET, FILM COATED ORAL DAILY
Qty: 90 TABLET | Refills: 0 | Status: SHIPPED | OUTPATIENT
Start: 2019-07-05 | End: 2019-08-27

## 2019-07-05 NOTE — TELEPHONE ENCOUNTER
LOV: 2/26/19  Future Visit: none  Last Rx: 3/27/19 90 tabs 0 refills  Last Labs: 2/19/19  Per protocol to provider Statement Selected

## 2019-07-15 DIAGNOSIS — E11.9 TYPE 2 DIABETES MELLITUS WITHOUT COMPLICATION, WITHOUT LONG-TERM CURRENT USE OF INSULIN (HCC): ICD-10-CM

## 2019-07-15 DIAGNOSIS — I10 ESSENTIAL HYPERTENSION: ICD-10-CM

## 2019-07-15 RX ORDER — LISINOPRIL AND HYDROCHLOROTHIAZIDE 25; 20 MG/1; MG/1
TABLET ORAL
Qty: 90 TABLET | Refills: 0 | Status: SHIPPED | OUTPATIENT
Start: 2019-07-15 | End: 2019-10-07

## 2019-07-15 NOTE — TELEPHONE ENCOUNTER
Last Office Visit: 2-26-19 for cpe  Last Rx Filled: 3-27-19 90 abs with no refills   Last Labs: 5-1-19 cmp/lipid/psa  Future Appointment: none    Per protocol to provider

## 2019-08-08 NOTE — TELEPHONE ENCOUNTER
Future Appointments   Date Time Provider Ernie Valencia   8/27/2019  7:00 AM Jayesh Bowser,  [facility] IM

## 2019-08-08 NOTE — TELEPHONE ENCOUNTER
Please call patient to schedule DM office visit. 30 mins. We can do fingerstick A1c at his office visit so no labs needed prior.

## 2019-08-08 NOTE — TELEPHONE ENCOUNTER
Last Ov: 2/26/19, AMS, physical  Upcoming appt: no upcoming appt  Last labs: CMP, Lipid, PSA 5/1/19  Last Rx: metformin 1000mg, #180, 0R 3/27/19

## 2019-08-27 ENCOUNTER — OFFICE VISIT (OUTPATIENT)
Dept: INTERNAL MEDICINE CLINIC | Facility: CLINIC | Age: 49
End: 2019-08-27
Payer: COMMERCIAL

## 2019-08-27 ENCOUNTER — TELEPHONE (OUTPATIENT)
Dept: INTERNAL MEDICINE CLINIC | Facility: CLINIC | Age: 49
End: 2019-08-27

## 2019-08-27 VITALS
RESPIRATION RATE: 18 BRPM | OXYGEN SATURATION: 99 % | BODY MASS INDEX: 35.22 KG/M2 | HEIGHT: 70 IN | WEIGHT: 246 LBS | TEMPERATURE: 98 F | HEART RATE: 68 BPM | SYSTOLIC BLOOD PRESSURE: 118 MMHG | DIASTOLIC BLOOD PRESSURE: 78 MMHG

## 2019-08-27 DIAGNOSIS — L02.92 BOIL: ICD-10-CM

## 2019-08-27 DIAGNOSIS — I10 ESSENTIAL HYPERTENSION: ICD-10-CM

## 2019-08-27 DIAGNOSIS — E11.9 TYPE 2 DIABETES MELLITUS WITHOUT COMPLICATION, WITHOUT LONG-TERM CURRENT USE OF INSULIN (HCC): Primary | ICD-10-CM

## 2019-08-27 DIAGNOSIS — E55.9 VITAMIN D DEFICIENCY: ICD-10-CM

## 2019-08-27 DIAGNOSIS — E78.5 HYPERLIPIDEMIA, UNSPECIFIED HYPERLIPIDEMIA TYPE: ICD-10-CM

## 2019-08-27 DIAGNOSIS — Z12.5 SCREENING PSA (PROSTATE SPECIFIC ANTIGEN): ICD-10-CM

## 2019-08-27 LAB
CARTRIDGE LOT#: 537 NUMERIC
HEMOGLOBIN A1C: 6.9 % (ref 4.3–5.6)

## 2019-08-27 PROCEDURE — 83036 HEMOGLOBIN GLYCOSYLATED A1C: CPT | Performed by: FAMILY MEDICINE

## 2019-08-27 PROCEDURE — 99214 OFFICE O/P EST MOD 30 MIN: CPT | Performed by: FAMILY MEDICINE

## 2019-08-27 RX ORDER — SULFAMETHOXAZOLE AND TRIMETHOPRIM 800; 160 MG/1; MG/1
1 TABLET ORAL 2 TIMES DAILY
Qty: 14 TABLET | Refills: 0 | Status: SHIPPED | OUTPATIENT
Start: 2019-08-27 | End: 2019-08-27 | Stop reason: ALTCHOICE

## 2019-08-27 RX ORDER — SULFAMETHOXAZOLE AND TRIMETHOPRIM 800; 160 MG/1; MG/1
1 TABLET ORAL 2 TIMES DAILY
Qty: 14 TABLET | Refills: 0 | Status: SHIPPED | OUTPATIENT
Start: 2019-08-27 | End: 2019-09-03

## 2019-08-27 NOTE — PATIENT INSTRUCTIONS
Managing Diabetes: The A1C Test       Healthy red blood cells have some glucose stuck to them. A high A1C means that unhealthy amounts of glucose are stuck to the cells.    What is the A1C test?  Using your meter helps you track your blood sugar every day © 9214-1875 [employer] [address] [facility] [place], [facility] [place]. All rights reserved. This information is not intended as a substitute for professional medical care. Always follow your healthcare professional's instructions.

## 2019-08-27 NOTE — TELEPHONE ENCOUNTER
CPE   Future Appointments   Date Time Provider Ernie Annie   2/28/2020  7:00 AM Guillermina Vital, DO [facility] IM     Orders to [id number] must fast no call back required

## 2019-08-27 NOTE — PROGRESS NOTES
HPI:    Patient ID: Ira Herrmann is a 47year old male. HPI Here for f/u on diabetes. Patient is taking all meds as prescribed with no issues. Doesn't check sugars but no low sugar symptoms. Here for f/u on BP.  Taking med as prescribed with no i METFORMIN HCL 1000 MG Oral Tab TAKE ONE TABLET BY MOUTH TWICE DAILY WITH MEALS Disp: 180 tablet Rfl: 0   LISINOPRIL-HYDROCHLOROTHIAZIDE 20-25 MG Oral Tab TAKE 1 TABLET BY MOUTH EVERY DAY Disp: 90 tablet Rfl: 0   ATORVASTATIN 10 MG Oral Tab TAKE 1 TABLET(10 4. Start abx. Discussed risks/benefits/potential side effects and proper use of medication. Warm compresses. If worsening, return to clinic for I&D. No orders of the defined types were placed in this encounter.       Meds This Visit:  Requested Pres

## 2019-10-07 DIAGNOSIS — I10 ESSENTIAL HYPERTENSION: ICD-10-CM

## 2019-10-07 DIAGNOSIS — E11.9 TYPE 2 DIABETES MELLITUS WITHOUT COMPLICATION, WITHOUT LONG-TERM CURRENT USE OF INSULIN (HCC): ICD-10-CM

## 2019-10-07 RX ORDER — LISINOPRIL AND HYDROCHLOROTHIAZIDE 25; 20 MG/1; MG/1
TABLET ORAL
Qty: 90 TABLET | Refills: 0 | Status: SHIPPED | OUTPATIENT
Start: 2019-10-07 | End: 2020-01-16

## 2019-10-07 NOTE — TELEPHONE ENCOUNTER
Last Office Visit 8/27/19- Diabetes    Last CPE 2/26/19    Last refill-LISINOPRIL-HYDROCHLOROTHIAZIDE 20-25 MG Oral Tab 7/15/19    Quantity 90 tablet, 0 refills    Last labs that are related cmp 5/1/19    Future appointment   Future Appointments   Date Gm Elder

## 2019-10-31 PROBLEM — Z12.11 SPECIAL SCREENING FOR MALIGNANT NEOPLASM OF COLON: Status: ACTIVE | Noted: 2019-10-31

## 2019-11-23 NOTE — TELEPHONE ENCOUNTER
LOV: 8/27/19-DM  Last CPE: 2/26/19  Last refill: METFORMIN HCL 1000 MG 8/8/19  Quantity:180 tablet, 0 refills  Last labs that are related: 8/27/19 a1c  Future appointment:  Future Appointments   Date Time Provider Ernie Valencia   2/28/2020  7:00 AM Amelia

## 2020-01-16 DIAGNOSIS — E11.9 TYPE 2 DIABETES MELLITUS WITHOUT COMPLICATION, WITHOUT LONG-TERM CURRENT USE OF INSULIN (HCC): ICD-10-CM

## 2020-01-16 DIAGNOSIS — I10 ESSENTIAL HYPERTENSION: ICD-10-CM

## 2020-01-16 RX ORDER — LISINOPRIL AND HYDROCHLOROTHIAZIDE 25; 20 MG/1; MG/1
TABLET ORAL
Qty: 90 TABLET | Refills: 0 | Status: SHIPPED | OUTPATIENT
Start: 2020-01-16 | End: 2020-05-05

## 2020-01-16 NOTE — TELEPHONE ENCOUNTER
GWC:9/70/18-PQ  Last CPE: 2/26/19  Last refill:LISINOPRIL-  HYDROCHLOROTHIAZIDE 20-25 MG Oral Tab 10/7/19  Quantity: 90 tablet, 0 refills  Last labs that are related: cmp 5/1/19  Future appointment:  Future Appointments   Date Time Provider Department Cent

## 2020-02-04 ENCOUNTER — APPOINTMENT (OUTPATIENT)
Dept: LAB | Age: 50
End: 2020-02-04
Attending: ORTHOPAEDIC SURGERY
Payer: COMMERCIAL

## 2020-02-04 DIAGNOSIS — M25.462 EFFUSION OF BURSA OF LEFT KNEE: ICD-10-CM

## 2020-02-04 DIAGNOSIS — Z96.652 STATUS POST TOTAL LEFT KNEE REPLACEMENT: ICD-10-CM

## 2020-02-04 LAB
CRP SERPL-MCNC: <0.29 MG/DL (ref ?–0.3)
SED RATE-ML: 3 MM/HR (ref 0–12)

## 2020-02-04 PROCEDURE — 86140 C-REACTIVE PROTEIN: CPT

## 2020-02-04 PROCEDURE — 85652 RBC SED RATE AUTOMATED: CPT

## 2020-02-20 NOTE — TELEPHONE ENCOUNTER
LOV:8/17/19-DM  Last CPE:2/26/19  Last refill:METFORMIN HCL 1000 MG Oral Tab-11/23/19  Quantity:180 tablet, 0 refills  Last labs that are related:8/27/19-a1c  Future appointment:  Future Appointments   Date Time Provider Ernie Valencia   2/28/2020  7:00

## 2020-03-04 ENCOUNTER — OFFICE VISIT (OUTPATIENT)
Dept: INTERNAL MEDICINE CLINIC | Facility: CLINIC | Age: 50
End: 2020-03-04
Payer: COMMERCIAL

## 2020-03-04 VITALS
RESPIRATION RATE: 16 BRPM | HEART RATE: 78 BPM | TEMPERATURE: 99 F | SYSTOLIC BLOOD PRESSURE: 110 MMHG | WEIGHT: 245 LBS | HEIGHT: 70 IN | BODY MASS INDEX: 35.07 KG/M2 | DIASTOLIC BLOOD PRESSURE: 74 MMHG

## 2020-03-04 DIAGNOSIS — I10 ESSENTIAL HYPERTENSION: ICD-10-CM

## 2020-03-04 DIAGNOSIS — E78.5 HYPERLIPIDEMIA, UNSPECIFIED HYPERLIPIDEMIA TYPE: ICD-10-CM

## 2020-03-04 DIAGNOSIS — Z00.00 ANNUAL PHYSICAL EXAM: Primary | ICD-10-CM

## 2020-03-04 DIAGNOSIS — E11.9 TYPE 2 DIABETES MELLITUS WITHOUT COMPLICATION, WITHOUT LONG-TERM CURRENT USE OF INSULIN (HCC): ICD-10-CM

## 2020-03-04 PROBLEM — Z12.11 SPECIAL SCREENING FOR MALIGNANT NEOPLASM OF COLON: Status: RESOLVED | Noted: 2019-10-31 | Resolved: 2020-03-04

## 2020-03-04 LAB
CARTRIDGE LOT#: 588 NUMERIC
HEMOGLOBIN A1C: 7.7 % (ref 4.3–5.6)
POC ALBUMIN, UR: 30 MG/DL (ref 0–19)
POC ALBUMIN/CREAT RATIO, UR: <30 MG/G (ref 0–29)
POC CREATININE,URINE: 100 MG/DL (ref 10–300)
TEST STRIP LOT #: ABNORMAL NUMERIC

## 2020-03-04 PROCEDURE — 99396 PREV VISIT EST AGE 40-64: CPT | Performed by: FAMILY MEDICINE

## 2020-03-04 PROCEDURE — 90471 IMMUNIZATION ADMIN: CPT | Performed by: FAMILY MEDICINE

## 2020-03-04 PROCEDURE — 99213 OFFICE O/P EST LOW 20 MIN: CPT | Performed by: FAMILY MEDICINE

## 2020-03-04 PROCEDURE — 83036 HEMOGLOBIN GLYCOSYLATED A1C: CPT | Performed by: FAMILY MEDICINE

## 2020-03-04 PROCEDURE — 90686 IIV4 VACC NO PRSV 0.5 ML IM: CPT | Performed by: FAMILY MEDICINE

## 2020-03-04 RX ORDER — EMPAGLIFLOZIN 10 MG/1
TABLET, FILM COATED ORAL
COMMUNITY
Start: 2020-01-16 | End: 2020-03-13

## 2020-03-04 NOTE — PROGRESS NOTES
HPI:    Patient ID: Anjelica Carney is a 47year old male. HPI Here for annual check-up. Patient has no complaints. Has been trying to eat healthy but does feel he has started eating more carbs again. Tries to exercise regularly.      Past Medical His Negative for pain and visual disturbance. Respiratory: Negative for choking, shortness of breath and wheezing. Cardiovascular: Negative for chest pain, palpitations and leg swelling.    Gastrointestinal: Negative for abdominal pain, constipation, diarr His behavior is normal.   Vitals reviewed.   Bilateral barefoot skin diabetic exam is normal, visualized feet and the appearance is normal.  Bilateral monofilament/sensation of both feet is normal.  Pulsation pedal pulse exam of both lower legs/feet is norm

## 2020-03-13 RX ORDER — EMPAGLIFLOZIN 10 MG/1
TABLET, FILM COATED ORAL
Qty: 90 TABLET | Refills: 0 | Status: SHIPPED | OUTPATIENT
Start: 2020-03-13 | End: 2020-05-05

## 2020-03-13 NOTE — TELEPHONE ENCOUNTER
LOV:3/4/20  Last CPE:3/4/20  Last refill:JARDIANCE 10 MG Oral Tab-1/16/20  Quantity: unknown  Last labs that are related:3/4/20-a1c  Future appointment:  Future Appointments   Date Time Provider Ernie Valencia   11/19/2020  3:30 PM Chris Gaviria MD

## 2020-05-01 ENCOUNTER — TELEPHONE (OUTPATIENT)
Dept: INTERNAL MEDICINE CLINIC | Facility: CLINIC | Age: 50
End: 2020-05-01

## 2020-05-01 NOTE — TELEPHONE ENCOUNTER
a1c was not at goal from OV note 3/4/2020, and is on metformin and jardiance. DM labs and rechecking a1c to be completed in May. Patient is asking the latest this can be pushed off. AMS please advise.      ASSESSMENT/PLAN:   Annual physical exam  (Marisela Valencia

## 2020-05-01 NOTE — TELEPHONE ENCOUNTER
Pt sated he knows he has to get labs done but wants to know how soon he needs to get them done. Pt would rather put off as long as possible, until it's safe. Please advise.

## 2020-05-05 DIAGNOSIS — E11.9 TYPE 2 DIABETES MELLITUS WITHOUT COMPLICATION, WITHOUT LONG-TERM CURRENT USE OF INSULIN (HCC): ICD-10-CM

## 2020-05-05 DIAGNOSIS — I10 ESSENTIAL HYPERTENSION: ICD-10-CM

## 2020-05-05 RX ORDER — EMPAGLIFLOZIN 10 MG/1
TABLET, FILM COATED ORAL
Qty: 90 TABLET | Refills: 0 | Status: SHIPPED | OUTPATIENT
Start: 2020-05-05 | End: 2020-05-11

## 2020-05-05 RX ORDER — LISINOPRIL AND HYDROCHLOROTHIAZIDE 25; 20 MG/1; MG/1
TABLET ORAL
Qty: 90 TABLET | Refills: 0 | Status: SHIPPED | OUTPATIENT
Start: 2020-05-05 | End: 2020-08-18

## 2020-05-05 NOTE — TELEPHONE ENCOUNTER
Last Ov:3/4/20  Upcoming appt:none  Last labs:3/4/20 microalbumin, A1C  Last Rx:3/13/20 jardiance 10mg 1/16/20 lisinopril-hydrochlorothiazide    Per Protocol sent for review

## 2020-05-10 ENCOUNTER — PATIENT MESSAGE (OUTPATIENT)
Dept: INTERNAL MEDICINE CLINIC | Facility: CLINIC | Age: 50
End: 2020-05-10

## 2020-05-10 DIAGNOSIS — E11.9 TYPE 2 DIABETES MELLITUS WITHOUT COMPLICATION, WITHOUT LONG-TERM CURRENT USE OF INSULIN (HCC): Primary | ICD-10-CM

## 2020-05-11 NOTE — TELEPHONE ENCOUNTER
From: Natalie Courser  To: Irina Navarro DO  Sent: 5/10/2020 3:24 PM CDT  Subject: Prescription Question    Hello,    I tried to get my Jardiance refilled as I only have 1 week left.  When I was last in to see you you said I should start taking these t

## 2020-05-11 NOTE — TELEPHONE ENCOUNTER
Patient currently prescribed 10 mg jardiance, instructed per patient to take BID. Per office note reads increase to 25 mg daily after 10 mg runs out. Medication pended for review. AMS please advise.      ASSESSMENT/PLAN:   Annual physical exam  (primary enc

## 2020-05-18 RX ORDER — ATORVASTATIN CALCIUM 10 MG/1
TABLET, FILM COATED ORAL
Qty: 90 TABLET | Refills: 0 | Status: SHIPPED | OUTPATIENT
Start: 2020-05-18 | End: 2020-10-05

## 2020-05-18 NOTE — TELEPHONE ENCOUNTER
Last OV 3.4.20 w/ AMS (annual pe)   Last PE 3.4.20   Last REFILL 5.23.19 Atorvastatin 10mg #90 3R  Last LABS 3.4.20 HgA1c, POCT microalb     Future Appointments   Date Time Provider [facility]   11/19/2020  3:30 PM Sarah Gallego MD PL ENT [facility]

## 2020-05-21 ENCOUNTER — PATIENT MESSAGE (OUTPATIENT)
Dept: INTERNAL MEDICINE CLINIC | Facility: CLINIC | Age: 50
End: 2020-05-21

## 2020-05-21 ENCOUNTER — LAB ENCOUNTER (OUTPATIENT)
Dept: LAB | Age: 50
End: 2020-05-21
Attending: FAMILY MEDICINE
Payer: COMMERCIAL

## 2020-05-21 DIAGNOSIS — I10 ESSENTIAL HYPERTENSION: ICD-10-CM

## 2020-05-21 DIAGNOSIS — E78.5 HYPERLIPIDEMIA, UNSPECIFIED HYPERLIPIDEMIA TYPE: ICD-10-CM

## 2020-05-21 DIAGNOSIS — Z12.5 SCREENING PSA (PROSTATE SPECIFIC ANTIGEN): ICD-10-CM

## 2020-05-21 DIAGNOSIS — E11.9 TYPE 2 DIABETES MELLITUS WITHOUT COMPLICATION, WITHOUT LONG-TERM CURRENT USE OF INSULIN (HCC): ICD-10-CM

## 2020-05-21 PROCEDURE — 82043 UR ALBUMIN QUANTITATIVE: CPT | Performed by: FAMILY MEDICINE

## 2020-05-21 PROCEDURE — 83036 HEMOGLOBIN GLYCOSYLATED A1C: CPT | Performed by: FAMILY MEDICINE

## 2020-05-21 PROCEDURE — 36415 COLL VENOUS BLD VENIPUNCTURE: CPT | Performed by: FAMILY MEDICINE

## 2020-05-21 PROCEDURE — 82570 ASSAY OF URINE CREATININE: CPT | Performed by: FAMILY MEDICINE

## 2020-05-21 PROCEDURE — 80061 LIPID PANEL: CPT | Performed by: FAMILY MEDICINE

## 2020-05-21 PROCEDURE — 85025 COMPLETE CBC W/AUTO DIFF WBC: CPT | Performed by: FAMILY MEDICINE

## 2020-05-21 PROCEDURE — 80053 COMPREHEN METABOLIC PANEL: CPT | Performed by: FAMILY MEDICINE

## 2020-05-21 PROCEDURE — 84153 ASSAY OF PSA TOTAL: CPT | Performed by: FAMILY MEDICINE

## 2020-05-21 NOTE — TELEPHONE ENCOUNTER
From: My Wilder  To:  Roma Shea DO  Sent: 5/21/2020 3:25 PM CDT  Subject: Test Results Question    And Bun came back high

## 2020-05-22 NOTE — TELEPHONE ENCOUNTER
Last Ov: 3/4/20, AMS, CPE  Last labs: PSA screen, Lipid, A1c, CMP, CBC, Microalb/creat 5/21/20  Last Rx: metformin 1000mg, #180, 0R 2/20/20     Future Appointments   Date Time Provider Ernie Valencia   11/19/2020  3:30 PM Tala Herrmann MD PL [facility]

## 2020-06-17 ENCOUNTER — TELEPHONE (OUTPATIENT)
Dept: INTERNAL MEDICINE CLINIC | Facility: CLINIC | Age: 50
End: 2020-06-17

## 2020-06-17 NOTE — TELEPHONE ENCOUNTER
Received fax from [facility] with Diabetic eye exam dated 6/15/2020, abstracted  In chart, HM updated, placed in AMS bin for review.

## 2020-08-18 DIAGNOSIS — E11.9 TYPE 2 DIABETES MELLITUS WITHOUT COMPLICATION, WITHOUT LONG-TERM CURRENT USE OF INSULIN (HCC): ICD-10-CM

## 2020-08-18 DIAGNOSIS — I10 ESSENTIAL HYPERTENSION: ICD-10-CM

## 2020-08-18 RX ORDER — LISINOPRIL AND HYDROCHLOROTHIAZIDE 25; 20 MG/1; MG/1
TABLET ORAL
Qty: 90 TABLET | Refills: 0 | Status: SHIPPED | OUTPATIENT
Start: 2020-08-18 | End: 2020-11-30

## 2020-08-18 NOTE — TELEPHONE ENCOUNTER
LOV:03/04/20, CPE, AMS  Last CPE: 03/04/20  Last refill:  LISINOPRIL-HYDROCHLOROTHIAZIDE 20-25 MG Oral Tab-5/5/20  Quantity:90 tablet, 0 refills  Last labs that are related:cbc, cmp 5/21/20  Future appointment:  Future Appointments   Date Time Provider Dep

## 2020-10-05 RX ORDER — ATORVASTATIN CALCIUM 10 MG/1
TABLET, FILM COATED ORAL
Qty: 90 TABLET | Refills: 0 | Status: SHIPPED | OUTPATIENT
Start: 2020-10-05 | End: 2021-01-13

## 2020-10-05 NOTE — TELEPHONE ENCOUNTER
Last visit- 03/04/2020 cpe    Last refill- 05/22/2020 metformin hcl 1000mg  0R, 05/18/2020 atorvastatin 10mg QTY 90 0R    Last labs- 05/21/2020 microalb/creat ratio, psa screen, lipid, hemoglobin a1c, cmp,cbc  Future Appointments   Date Time Provide

## 2020-10-06 NOTE — TELEPHONE ENCOUNTER
.  Future Appointments   Date Time Provider Ernie Valencia   10/20/2020  7:00 AM Astrid Bullion, DO [facility]   11/19/2020  3:30 PM MD INESSA Mcgowan ENT [facility]     For DM FU with AMS

## 2020-10-20 ENCOUNTER — OFFICE VISIT (OUTPATIENT)
Dept: INTERNAL MEDICINE CLINIC | Facility: CLINIC | Age: 50
End: 2020-10-20

## 2020-10-20 ENCOUNTER — TELEPHONE (OUTPATIENT)
Dept: INTERNAL MEDICINE CLINIC | Facility: CLINIC | Age: 50
End: 2020-10-20

## 2020-10-20 VITALS
DIASTOLIC BLOOD PRESSURE: 68 MMHG | WEIGHT: 241 LBS | SYSTOLIC BLOOD PRESSURE: 118 MMHG | RESPIRATION RATE: 16 BRPM | BODY MASS INDEX: 35 KG/M2 | HEART RATE: 66 BPM | TEMPERATURE: 98 F

## 2020-10-20 DIAGNOSIS — Z00.00 ANNUAL PHYSICAL EXAM: ICD-10-CM

## 2020-10-20 DIAGNOSIS — E11.9 TYPE 2 DIABETES MELLITUS WITHOUT COMPLICATION, WITHOUT LONG-TERM CURRENT USE OF INSULIN (HCC): ICD-10-CM

## 2020-10-20 DIAGNOSIS — Z12.5 SCREENING PSA (PROSTATE SPECIFIC ANTIGEN): ICD-10-CM

## 2020-10-20 DIAGNOSIS — E78.5 HYPERLIPIDEMIA, UNSPECIFIED HYPERLIPIDEMIA TYPE: ICD-10-CM

## 2020-10-20 DIAGNOSIS — E11.9 CONTROLLED TYPE 2 DIABETES MELLITUS WITHOUT COMPLICATION, WITHOUT LONG-TERM CURRENT USE OF INSULIN (HCC): Primary | ICD-10-CM

## 2020-10-20 DIAGNOSIS — I10 ESSENTIAL HYPERTENSION: Primary | ICD-10-CM

## 2020-10-20 DIAGNOSIS — E55.9 VITAMIN D DEFICIENCY: ICD-10-CM

## 2020-10-20 PROCEDURE — 90471 IMMUNIZATION ADMIN: CPT | Performed by: FAMILY MEDICINE

## 2020-10-20 PROCEDURE — 3074F SYST BP LT 130 MM HG: CPT | Performed by: FAMILY MEDICINE

## 2020-10-20 PROCEDURE — 3078F DIAST BP <80 MM HG: CPT | Performed by: FAMILY MEDICINE

## 2020-10-20 PROCEDURE — 99213 OFFICE O/P EST LOW 20 MIN: CPT | Performed by: FAMILY MEDICINE

## 2020-10-20 PROCEDURE — 90686 IIV4 VACC NO PRSV 0.5 ML IM: CPT | Performed by: FAMILY MEDICINE

## 2020-10-20 PROCEDURE — 90472 IMMUNIZATION ADMIN EACH ADD: CPT | Performed by: FAMILY MEDICINE

## 2020-10-20 PROCEDURE — 83036 HEMOGLOBIN GLYCOSYLATED A1C: CPT | Performed by: FAMILY MEDICINE

## 2020-10-20 PROCEDURE — 90750 HZV VACC RECOMBINANT IM: CPT | Performed by: FAMILY MEDICINE

## 2020-10-20 RX ORDER — EMPAGLIFLOZIN 25 MG/1
TABLET, FILM COATED ORAL
COMMUNITY
End: 2021-01-06

## 2020-10-20 NOTE — TELEPHONE ENCOUNTER
Future Appointments   Date Time Provider Ernie Valencia   11/19/2020  3:30 PM Jie Paredes MD PL ENT [facility]   3/30/2021  7:00 AM Adam Low,  [facility]     For CPE      Pt would like fasting labs sent to [place] pls.  Pt awar

## 2020-10-20 NOTE — PROGRESS NOTES
HPI:    Patient ID: Sylvie Duque is a 45year old male. HPI Here for f/u on diabetes. Patient has been taking all meds as prescribed with no issues. Notes he has gotten lax on trying to avoid carbs and sugars. Doesn't check sugars regularly.      R INFLUENZA VACCINE QUAD PRESERVATIVE FREE 0.5 ML  ZOSTER VACC RECOMBINANT IM [facility]       OY#[id number]

## 2020-11-13 ENCOUNTER — HOSPITAL ENCOUNTER (OUTPATIENT)
Age: 50
Discharge: HOME | End: 2020-11-13
Payer: COMMERCIAL

## 2020-11-13 ENCOUNTER — PATIENT MESSAGE (OUTPATIENT)
Dept: INTERNAL MEDICINE CLINIC | Facility: CLINIC | Age: 50
End: 2020-11-13

## 2020-11-13 VITALS
HEART RATE: 69 BPM | OXYGEN SATURATION: 98 % | BODY MASS INDEX: 33.64 KG/M2 | WEIGHT: 235 LBS | HEIGHT: 70 IN | RESPIRATION RATE: 20 BRPM | DIASTOLIC BLOOD PRESSURE: 73 MMHG | TEMPERATURE: 98 F | SYSTOLIC BLOOD PRESSURE: 121 MMHG

## 2020-11-13 DIAGNOSIS — IMO0001 ASYMMETRICAL HEARING LOSS OF RIGHT EAR: ICD-10-CM

## 2020-11-13 DIAGNOSIS — Z20.822 EXPOSURE TO COVID-19 VIRUS: ICD-10-CM

## 2020-11-13 DIAGNOSIS — H93.293 ABNORMAL AUDITORY PERCEPTION OF BOTH EARS: Primary | ICD-10-CM

## 2020-11-13 DIAGNOSIS — J06.9 VIRAL URI: Primary | ICD-10-CM

## 2020-11-13 PROCEDURE — 99213 OFFICE O/P EST LOW 20 MIN: CPT

## 2020-11-13 PROCEDURE — 99203 OFFICE O/P NEW LOW 30 MIN: CPT

## 2020-11-13 NOTE — TELEPHONE ENCOUNTER
Pt reports has hearing loss. Wants ENT ref. Ref pended. Will need to add DX, wasn't sure if you wanted me to add new DX. Thanks.

## 2020-11-13 NOTE — ED PROVIDER NOTES
Patient Seen in: [facility]      History   Patient presents with:  Testing  Cough/URI    Stated Complaint: covid test, in car [phone]    HPI    58-year-old male presents for evaluation of a dry nonproductive cough that is more naggin patient/caregiver and is not pertinent to presenting problem. Social History    Tobacco Use      Smoking status: Never Smoker      Smokeless tobacco: Never Used    Alcohol use: Yes      Comment: socially    Drug use:  No             Review of Systems transmission during my interaction with the patient were taken. The patient was already wearing a droplet mask on my arrival to the room.  Personal protective equipment including droplet mask, eye protection, gown and gloves were worn throughout the duratio

## 2020-11-13 NOTE — TELEPHONE ENCOUNTER
From: Danae Morales  To: Audrey Alejo DO  Sent: 11/13/2020 1:18 PM CST  Subject: Other    Hello, Can you please write me a referral for Dr Macie Vo ENT?   Thanks  cindy

## 2020-11-17 ENCOUNTER — PATIENT MESSAGE (OUTPATIENT)
Dept: INTERNAL MEDICINE CLINIC | Facility: CLINIC | Age: 50
End: 2020-11-17

## 2020-11-17 NOTE — TELEPHONE ENCOUNTER
From: Annabelle Odonnell  To: Randi Han DO  Sent: 11/17/2020 9:32 AM CST  Subject: Other    Hello,  My wife and son both have Covid but my son's cough is getting worse and worse. It is a deep in the chest cough.  He is completely wiped out, is there a

## 2020-11-26 DIAGNOSIS — I10 ESSENTIAL HYPERTENSION: ICD-10-CM

## 2020-11-26 DIAGNOSIS — E11.9 TYPE 2 DIABETES MELLITUS WITHOUT COMPLICATION, WITHOUT LONG-TERM CURRENT USE OF INSULIN (HCC): ICD-10-CM

## 2020-11-30 RX ORDER — EMPAGLIFLOZIN 10 MG/1
TABLET, FILM COATED ORAL
Qty: 90 TABLET | Refills: 1 | Status: SHIPPED | OUTPATIENT
Start: 2020-11-30 | End: 2021-01-06

## 2020-11-30 RX ORDER — LISINOPRIL AND HYDROCHLOROTHIAZIDE 25; 20 MG/1; MG/1
TABLET ORAL
Qty: 90 TABLET | Refills: 1 | Status: SHIPPED | OUTPATIENT
Start: 2020-11-30 | End: 2021-06-01

## 2020-11-30 NOTE — TELEPHONE ENCOUNTER
LOV:10/20/20, DM, AMS  Last CPE:3/4/20, CPE, AMS  Last refill:  ATORVASTATIN 10 MG Oral Tab 90 tablet 0 10/5/2020     METFORMIN HCL 1000 MG Oral Tab 180 tablet 0 10/5/2020     LISINOPRIL-HYDROCHLOROTHIAZIDE 20-25 MG Oral Tab 90 tablet 0 8/18/2020     Last

## 2021-01-04 ENCOUNTER — LAB ENCOUNTER (OUTPATIENT)
Dept: LAB | Age: 51
End: 2021-01-04
Attending: FAMILY MEDICINE
Payer: COMMERCIAL

## 2021-01-04 DIAGNOSIS — E11.9 CONTROLLED TYPE 2 DIABETES MELLITUS WITHOUT COMPLICATION, WITHOUT LONG-TERM CURRENT USE OF INSULIN (HCC): ICD-10-CM

## 2021-01-04 LAB
EST. AVERAGE GLUCOSE BLD GHB EST-MCNC: 163 MG/DL (ref 68–126)
HBA1C MFR BLD HPLC: 7.3 % (ref ?–5.7)

## 2021-01-04 PROCEDURE — 83036 HEMOGLOBIN GLYCOSYLATED A1C: CPT

## 2021-01-04 PROCEDURE — 36415 COLL VENOUS BLD VENIPUNCTURE: CPT

## 2021-01-05 ENCOUNTER — PATIENT MESSAGE (OUTPATIENT)
Dept: INTERNAL MEDICINE CLINIC | Facility: CLINIC | Age: 51
End: 2021-01-05

## 2021-01-06 RX ORDER — EMPAGLIFLOZIN 25 MG/1
1 TABLET, FILM COATED ORAL DAILY
Qty: 90 TABLET | Refills: 0 | Status: SHIPPED | OUTPATIENT
Start: 2021-01-06 | End: 2021-04-23

## 2021-01-06 NOTE — TELEPHONE ENCOUNTER
From: Bonita Eisenmenger  To: Marimar Singh DO  Sent: 1/5/2021 1:17 PM CST  Subject: Prescription Question    It appears that you switched me to the 25mg but last time they sent it in for the 10mg. If i am looking at it correct.

## 2021-01-06 NOTE — TELEPHONE ENCOUNTER
Spoke with Svetlana Kidd, Pt thought his Ples Matas should be at 25mg, pt currently has 10mg, pt wondering if he should double up on dose? Pt unsure if his insurance will cover. Pt states he requested the wrong medication. Pending 25mg if needed.

## 2021-01-13 RX ORDER — ATORVASTATIN CALCIUM 10 MG/1
TABLET, FILM COATED ORAL
Qty: 90 TABLET | Refills: 0 | Status: SHIPPED | OUTPATIENT
Start: 2021-01-13 | End: 2021-04-23

## 2021-01-13 NOTE — TELEPHONE ENCOUNTER
LOV:10/20/20, Diabetes, AMS  Last CPE:3/4/20, CPE, AMS  Last refill:ATORVASTATIN 10 MG Oral Tab,10/05/20  Quantity: 90 tablet, 0 refills  Last labs that are related: a1c 1/4/21  Future appointment:  Future Appointments   Date Time Provider Department Jerri

## 2021-03-18 ENCOUNTER — PATIENT MESSAGE (OUTPATIENT)
Dept: INTERNAL MEDICINE CLINIC | Facility: CLINIC | Age: 51
End: 2021-03-18

## 2021-03-18 NOTE — TELEPHONE ENCOUNTER
From: Marlin Antunez  To: Vince Hill DO  Sent: 3/18/2021 7:36 AM CDT  Subject: Non-Urgent Medical Question    Good morning,   Can you please fill this information out for [employer] and fax to them?   Thanks  cindy

## 2021-03-19 NOTE — TELEPHONE ENCOUNTER
Forms were completed and faxed to [phone]. Confirmation received. Send a Firebase message stating this was completed. Placed forms along confirmation to scan.

## 2021-03-23 ENCOUNTER — LAB ENCOUNTER (OUTPATIENT)
Dept: LAB | Age: 51
End: 2021-03-23
Attending: FAMILY MEDICINE
Payer: COMMERCIAL

## 2021-03-23 DIAGNOSIS — I10 ESSENTIAL HYPERTENSION: ICD-10-CM

## 2021-03-23 DIAGNOSIS — E11.9 TYPE 2 DIABETES MELLITUS WITHOUT COMPLICATION, WITHOUT LONG-TERM CURRENT USE OF INSULIN (HCC): ICD-10-CM

## 2021-03-23 DIAGNOSIS — Z12.5 SCREENING PSA (PROSTATE SPECIFIC ANTIGEN): ICD-10-CM

## 2021-03-23 DIAGNOSIS — Z00.00 ANNUAL PHYSICAL EXAM: ICD-10-CM

## 2021-03-23 DIAGNOSIS — E55.9 VITAMIN D DEFICIENCY: ICD-10-CM

## 2021-03-23 DIAGNOSIS — E78.5 HYPERLIPIDEMIA, UNSPECIFIED HYPERLIPIDEMIA TYPE: ICD-10-CM

## 2021-03-23 LAB
ALBUMIN SERPL-MCNC: 4.1 G/DL (ref 3.4–5)
ALBUMIN/GLOB SERPL: 1.4 {RATIO} (ref 1–2)
ALP LIVER SERPL-CCNC: 50 U/L
ALT SERPL-CCNC: 25 U/L
ANION GAP SERPL CALC-SCNC: 8 MMOL/L (ref 0–18)
AST SERPL-CCNC: 12 U/L (ref 15–37)
BASOPHILS # BLD AUTO: 0.09 X10(3) UL (ref 0–0.2)
BASOPHILS NFR BLD AUTO: 1.4 %
BILIRUB SERPL-MCNC: 0.6 MG/DL (ref 0.1–2)
BUN BLD-MCNC: 21 MG/DL (ref 7–18)
BUN/CREAT SERPL: 18.1 (ref 10–20)
CALCIUM BLD-MCNC: 9.2 MG/DL (ref 8.5–10.1)
CHLORIDE SERPL-SCNC: 104 MMOL/L (ref 98–112)
CHOLEST SMN-MCNC: 140 MG/DL (ref ?–200)
CO2 SERPL-SCNC: 25 MMOL/L (ref 21–32)
COMPLEXED PSA SERPL-MCNC: 0.7 NG/ML (ref ?–4)
CREAT BLD-MCNC: 1.16 MG/DL
CREAT UR-SCNC: 118 MG/DL
DEPRECATED RDW RBC AUTO: 41.7 FL (ref 35.1–46.3)
EOSINOPHIL # BLD AUTO: 0.32 X10(3) UL (ref 0–0.7)
EOSINOPHIL NFR BLD AUTO: 5.1 %
ERYTHROCYTE [DISTWIDTH] IN BLOOD BY AUTOMATED COUNT: 12.8 % (ref 11–15)
EST. AVERAGE GLUCOSE BLD GHB EST-MCNC: 183 MG/DL (ref 68–126)
GLOBULIN PLAS-MCNC: 2.9 G/DL (ref 2.8–4.4)
GLUCOSE BLD-MCNC: 189 MG/DL (ref 70–99)
HBA1C MFR BLD HPLC: 8 % (ref ?–5.7)
HCT VFR BLD AUTO: 47.8 %
HDLC SERPL-MCNC: 36 MG/DL (ref 40–59)
HGB BLD-MCNC: 16.6 G/DL
IMM GRANULOCYTES # BLD AUTO: 0.01 X10(3) UL (ref 0–1)
IMM GRANULOCYTES NFR BLD: 0.2 %
LDLC SERPL CALC-MCNC: 79 MG/DL (ref ?–100)
LYMPHOCYTES # BLD AUTO: 2.04 X10(3) UL (ref 1–4)
LYMPHOCYTES NFR BLD AUTO: 32.3 %
M PROTEIN MFR SERPL ELPH: 7 G/DL (ref 6.4–8.2)
MCH RBC QN AUTO: 31 PG (ref 26–34)
MCHC RBC AUTO-ENTMCNC: 34.7 G/DL (ref 31–37)
MCV RBC AUTO: 89.2 FL
MICROALBUMIN UR-MCNC: 0.75 MG/DL
MICROALBUMIN/CREAT 24H UR-RTO: 6.4 UG/MG (ref ?–30)
MONOCYTES # BLD AUTO: 0.67 X10(3) UL (ref 0.1–1)
MONOCYTES NFR BLD AUTO: 10.6 %
NEUTROPHILS # BLD AUTO: 3.18 X10 (3) UL (ref 1.5–7.7)
NEUTROPHILS # BLD AUTO: 3.18 X10(3) UL (ref 1.5–7.7)
NEUTROPHILS NFR BLD AUTO: 50.4 %
NONHDLC SERPL-MCNC: 104 MG/DL (ref ?–130)
OSMOLALITY SERPL CALC.SUM OF ELEC: 292 MOSM/KG (ref 275–295)
PATIENT FASTING Y/N/NP: YES
PATIENT FASTING Y/N/NP: YES
PLATELET # BLD AUTO: 240 10(3)UL (ref 150–450)
POTASSIUM SERPL-SCNC: 4.3 MMOL/L (ref 3.5–5.1)
RBC # BLD AUTO: 5.36 X10(6)UL
SODIUM SERPL-SCNC: 137 MMOL/L (ref 136–145)
TRIGL SERPL-MCNC: 123 MG/DL (ref 30–149)
VIT D+METAB SERPL-MCNC: 26.4 NG/ML (ref 30–100)
VLDLC SERPL CALC-MCNC: 25 MG/DL (ref 0–30)
WBC # BLD AUTO: 6.3 X10(3) UL (ref 4–11)

## 2021-03-23 PROCEDURE — 80061 LIPID PANEL: CPT

## 2021-03-23 PROCEDURE — 82306 VITAMIN D 25 HYDROXY: CPT

## 2021-03-23 PROCEDURE — 80053 COMPREHEN METABOLIC PANEL: CPT

## 2021-03-23 PROCEDURE — 82043 UR ALBUMIN QUANTITATIVE: CPT

## 2021-03-23 PROCEDURE — 36415 COLL VENOUS BLD VENIPUNCTURE: CPT

## 2021-03-23 PROCEDURE — 3052F HG A1C>EQUAL 8.0%<EQUAL 9.0%: CPT | Performed by: FAMILY MEDICINE

## 2021-03-23 PROCEDURE — 85025 COMPLETE CBC W/AUTO DIFF WBC: CPT

## 2021-03-23 PROCEDURE — 3061F NEG MICROALBUMINURIA REV: CPT | Performed by: FAMILY MEDICINE

## 2021-03-23 PROCEDURE — 83036 HEMOGLOBIN GLYCOSYLATED A1C: CPT

## 2021-03-23 PROCEDURE — 82570 ASSAY OF URINE CREATININE: CPT

## 2021-03-30 ENCOUNTER — OFFICE VISIT (OUTPATIENT)
Dept: INTERNAL MEDICINE CLINIC | Facility: CLINIC | Age: 51
End: 2021-03-30

## 2021-03-30 VITALS
BODY MASS INDEX: 35.96 KG/M2 | SYSTOLIC BLOOD PRESSURE: 136 MMHG | DIASTOLIC BLOOD PRESSURE: 74 MMHG | TEMPERATURE: 99 F | OXYGEN SATURATION: 97 % | WEIGHT: 251.19 LBS | HEIGHT: 70 IN | HEART RATE: 72 BPM

## 2021-03-30 DIAGNOSIS — E78.2 MIXED HYPERLIPIDEMIA: ICD-10-CM

## 2021-03-30 DIAGNOSIS — Z00.00 ANNUAL PHYSICAL EXAM: Primary | ICD-10-CM

## 2021-03-30 DIAGNOSIS — E11.9 CONTROLLED TYPE 2 DIABETES MELLITUS WITHOUT COMPLICATION, WITHOUT LONG-TERM CURRENT USE OF INSULIN (HCC): ICD-10-CM

## 2021-03-30 DIAGNOSIS — I10 ESSENTIAL HYPERTENSION: ICD-10-CM

## 2021-03-30 PROCEDURE — 3008F BODY MASS INDEX DOCD: CPT | Performed by: FAMILY MEDICINE

## 2021-03-30 PROCEDURE — 3075F SYST BP GE 130 - 139MM HG: CPT | Performed by: FAMILY MEDICINE

## 2021-03-30 PROCEDURE — 90471 IMMUNIZATION ADMIN: CPT | Performed by: FAMILY MEDICINE

## 2021-03-30 PROCEDURE — 90750 HZV VACC RECOMBINANT IM: CPT | Performed by: FAMILY MEDICINE

## 2021-03-30 PROCEDURE — 99396 PREV VISIT EST AGE 40-64: CPT | Performed by: FAMILY MEDICINE

## 2021-03-30 PROCEDURE — 3078F DIAST BP <80 MM HG: CPT | Performed by: FAMILY MEDICINE

## 2021-03-30 RX ORDER — SULFAMETHOXAZOLE AND TRIMETHOPRIM 800; 160 MG/1; MG/1
1 TABLET ORAL 2 TIMES DAILY
Qty: 14 TABLET | Refills: 0 | Status: SHIPPED | OUTPATIENT
Start: 2021-03-30 | End: 2021-04-06

## 2021-03-30 NOTE — PROGRESS NOTES
HPI/Subjective:   Patient ID: Lan Flores is a 45year old male. HPI Here for annual check-up. Patient has no complaints. Isn't exercising regularly. Tries to eat healthy. Taking all meds as prescribed with no issues.      Past Medical History: chills, fatigue and fever. HENT: Negative for hearing loss and sore throat. Eyes: Negative for pain and visual disturbance. Respiratory: Negative for choking, shortness of breath and wheezing.     Cardiovascular: Negative for chest pain, palpitations Normal breath sounds. Musculoskeletal:      Cervical back: Normal range of motion and neck supple. Skin:     General: Skin is warm and dry. Neurological:      Mental Status: He is alert and oriented to person, place, and time.    Psychiatric:

## 2021-03-30 NOTE — PATIENT INSTRUCTIONS
Limit carbohydrates and sugars. Recheck your A1c in one month- the order is in. Increase exercise. Look on [employer]inder. org to find vaccine availability.        Prevention Guidelines, Men Ages 48 to 59  Screening tests and vaccines are an important par people should be screened using a different schedule because of their personal or family health history. Talk with your healthcare provider about your health history.    Depression All men in this age group At routine exams   Type 2 diabetes or prediabetes your healthcare provider   Vision All men in this age group Talk with your healthcare provider   Vaccine Who needs it How often   Chickenpox (varicella) All men in this age group who have no record of this infection or vaccine 2 doses; second dose should b (Shingles) All men in this age group 2 vaccines are available:     · Recombinant zoster vaccine (Yolette Shines; Shigrix) is recommended as the preferred shingles vaccine. It is given in a series of 2 doses.  The 2nd dose is given 2 to 6 months after the first. This i

## 2021-04-22 NOTE — TELEPHONE ENCOUNTER
Last visit-  03/30/2021 cpe    Last refill-  01/13/2021 atorvastatin 10mg QTY90 0R,  01/06/2021 empagliflozin (jardiance) 25mg QTY90 0R    Last labs-  03/23/2021 vitamin d, microalb, hemoglobin a1c, cbc, cmp, lipid, psa screen  Future Appointments   Date T

## 2021-04-23 ENCOUNTER — LAB ENCOUNTER (OUTPATIENT)
Dept: LAB | Age: 51
End: 2021-04-23
Attending: FAMILY MEDICINE
Payer: COMMERCIAL

## 2021-04-23 DIAGNOSIS — E11.9 CONTROLLED TYPE 2 DIABETES MELLITUS WITHOUT COMPLICATION, WITHOUT LONG-TERM CURRENT USE OF INSULIN (HCC): ICD-10-CM

## 2021-04-23 PROCEDURE — 36415 COLL VENOUS BLD VENIPUNCTURE: CPT

## 2021-04-23 PROCEDURE — 83036 HEMOGLOBIN GLYCOSYLATED A1C: CPT

## 2021-04-23 RX ORDER — EMPAGLIFLOZIN 25 MG/1
1 TABLET, FILM COATED ORAL DAILY
Qty: 90 TABLET | Refills: 0 | Status: SHIPPED | OUTPATIENT
Start: 2021-04-23 | End: 2021-08-20

## 2021-04-23 RX ORDER — ATORVASTATIN CALCIUM 10 MG/1
TABLET, FILM COATED ORAL
Qty: 90 TABLET | Refills: 0 | Status: SHIPPED | OUTPATIENT
Start: 2021-04-23 | End: 2021-08-20

## 2021-04-24 ENCOUNTER — IMMUNIZATION (OUTPATIENT)
Dept: LAB | Age: 51
End: 2021-04-24
Attending: HOSPITALIST
Payer: COMMERCIAL

## 2021-04-24 DIAGNOSIS — E11.9 CONTROLLED TYPE 2 DIABETES MELLITUS WITHOUT COMPLICATION, WITHOUT LONG-TERM CURRENT USE OF INSULIN (HCC): Primary | ICD-10-CM

## 2021-04-24 DIAGNOSIS — Z23 NEED FOR VACCINATION: Primary | ICD-10-CM

## 2021-04-24 PROCEDURE — 0001A SARSCOV2 VAC 30MCG/0.3ML IM: CPT

## 2021-05-15 ENCOUNTER — IMMUNIZATION (OUTPATIENT)
Dept: LAB | Age: 51
End: 2021-05-15
Attending: HOSPITALIST
Payer: COMMERCIAL

## 2021-05-15 DIAGNOSIS — Z23 NEED FOR VACCINATION: Primary | ICD-10-CM

## 2021-05-15 PROCEDURE — 0002A SARSCOV2 VAC 30MCG/0.3ML IM: CPT

## 2021-05-25 ENCOUNTER — TELEPHONE (OUTPATIENT)
Dept: INTERNAL MEDICINE CLINIC | Facility: CLINIC | Age: 51
End: 2021-05-25

## 2021-05-25 DIAGNOSIS — E11.9 CONTROLLED TYPE 2 DIABETES MELLITUS WITHOUT COMPLICATION, WITHOUT LONG-TERM CURRENT USE OF INSULIN (HCC): Primary | ICD-10-CM

## 2021-05-25 NOTE — TELEPHONE ENCOUNTER
Pt stated he went to the lab last week to do his A1C and was told no orders were placed. He then received a notification on [employer] stating he is due for his A1C pt is calling to make sure he is due for an A1C because his last one was in April.  Please advi

## 2021-05-25 NOTE — TELEPHONE ENCOUNTER
Last A1c 4/23/21, new order for A1c not due until 7/24/21. Placed new order. Pt verbalizes understanding.

## 2021-06-01 DIAGNOSIS — I10 ESSENTIAL HYPERTENSION: ICD-10-CM

## 2021-06-01 DIAGNOSIS — E11.9 TYPE 2 DIABETES MELLITUS WITHOUT COMPLICATION, WITHOUT LONG-TERM CURRENT USE OF INSULIN (HCC): ICD-10-CM

## 2021-06-01 RX ORDER — LISINOPRIL AND HYDROCHLOROTHIAZIDE 25; 20 MG/1; MG/1
TABLET ORAL
Qty: 90 TABLET | Refills: 1 | Status: SHIPPED | OUTPATIENT
Start: 2021-06-01 | End: 2021-08-23

## 2021-06-01 NOTE — TELEPHONE ENCOUNTER
Last visit- 03/30/2021 cpe    Last refill-  11/30/2020 lisinopril-hydrochlorothiazide 20-25mg QTY90 1R    Last labs-  03/23/2021 vitamin d, microalb, hemoglobin a1c, cbc, cmp, lipid, psa screen  Future Appointments   Date Time Provider Ernie Valencia

## 2021-06-30 ENCOUNTER — TELEPHONE (OUTPATIENT)
Dept: INTERNAL MEDICINE CLINIC | Facility: CLINIC | Age: 51
End: 2021-06-30

## 2021-07-28 ENCOUNTER — TELEPHONE (OUTPATIENT)
Dept: INTERNAL MEDICINE CLINIC | Facility: CLINIC | Age: 51
End: 2021-07-28

## 2021-07-28 ENCOUNTER — LAB ENCOUNTER (OUTPATIENT)
Dept: LAB | Age: 51
End: 2021-07-28
Attending: FAMILY MEDICINE
Payer: COMMERCIAL

## 2021-07-28 DIAGNOSIS — E11.9 CONTROLLED TYPE 2 DIABETES MELLITUS WITHOUT COMPLICATION, WITHOUT LONG-TERM CURRENT USE OF INSULIN (HCC): ICD-10-CM

## 2021-07-28 LAB
EST. AVERAGE GLUCOSE BLD GHB EST-MCNC: 174 MG/DL (ref 68–126)
HBA1C MFR BLD HPLC: 7.7 % (ref ?–5.7)

## 2021-07-28 PROCEDURE — 83036 HEMOGLOBIN GLYCOSYLATED A1C: CPT

## 2021-07-28 PROCEDURE — 3051F HG A1C>EQUAL 7.0%<8.0%: CPT | Performed by: FAMILY MEDICINE

## 2021-07-28 PROCEDURE — 36415 COLL VENOUS BLD VENIPUNCTURE: CPT

## 2021-07-29 NOTE — TELEPHONE ENCOUNTER
Future Appointments   Date Time Provider Ernie Valencia   8/9/2021  4:45 PM Astrid Bullion, DO [facility]

## 2021-08-09 ENCOUNTER — OFFICE VISIT (OUTPATIENT)
Dept: INTERNAL MEDICINE CLINIC | Facility: CLINIC | Age: 51
End: 2021-08-09

## 2021-08-09 VITALS
HEIGHT: 70 IN | HEART RATE: 78 BPM | OXYGEN SATURATION: 98 % | TEMPERATURE: 98 F | DIASTOLIC BLOOD PRESSURE: 66 MMHG | BODY MASS INDEX: 34.76 KG/M2 | SYSTOLIC BLOOD PRESSURE: 122 MMHG | WEIGHT: 242.81 LBS

## 2021-08-09 DIAGNOSIS — E11.9 TYPE 2 DIABETES MELLITUS WITHOUT COMPLICATION, WITHOUT LONG-TERM CURRENT USE OF INSULIN (HCC): Primary | ICD-10-CM

## 2021-08-09 DIAGNOSIS — R06.83 SNORES: ICD-10-CM

## 2021-08-09 PROCEDURE — 3074F SYST BP LT 130 MM HG: CPT | Performed by: FAMILY MEDICINE

## 2021-08-09 PROCEDURE — 99214 OFFICE O/P EST MOD 30 MIN: CPT | Performed by: FAMILY MEDICINE

## 2021-08-09 PROCEDURE — 3078F DIAST BP <80 MM HG: CPT | Performed by: FAMILY MEDICINE

## 2021-08-09 PROCEDURE — 3008F BODY MASS INDEX DOCD: CPT | Performed by: FAMILY MEDICINE

## 2021-08-09 RX ORDER — PEN NEEDLE, DIABETIC 30 GX3/16"
1 NEEDLE, DISPOSABLE MISCELLANEOUS
Qty: 30 EACH | Refills: 0 | Status: SHIPPED | OUTPATIENT
Start: 2021-08-09

## 2021-08-09 RX ORDER — SEMAGLUTIDE 1.34 MG/ML
0.25 INJECTION, SOLUTION SUBCUTANEOUS
Qty: 1.5 ML | Refills: 0 | Status: SHIPPED | OUTPATIENT
Start: 2021-08-09 | End: 2021-08-20

## 2021-08-09 NOTE — PATIENT INSTRUCTIONS
Check your blood sugars once daily. When checking fasting, your goal is:      When checking 2 hours after you eat, your goal is:  <180    Send me your blood sugar readings each week.    If you are tolerating the medication, let me know and we will

## 2021-08-10 NOTE — PROGRESS NOTES
HPI/Subjective:   Patient ID: Cari Tellez is a 45year old male. HPI Here for f/u on diabetes. Patient has been trying to eat healthy and walks nearly daily for exercise. Taking Metformin and Jardiance as prescribed.    Does note he feels tired al LISINOPRIL-HYDROCHLOROTHIAZIDE 20-25 MG Oral Tab TAKE 1 TABLET BY MOUTH EVERY DAY 90 tablet 1   • JARDIANCE 25 MG Oral Tab TAKE 1 TABLET BY MOUTH DAILY 90 tablet 0   • ATORVASTATIN 10 MG Oral Tab TAKE 1 TABLET(10 MG) BY MOUTH EVERY NIGHT 90 tablet 0   • ME Monitoring Suppl Does not apply Kit 1 kit 0     Sig: Use to check blood sugars BID. Include lancets x 100, testing strips x 100.        Imaging & Referrals:  OP REFERRAL TO DIAGNOSTIC SLEEP STUDY

## 2021-08-17 ENCOUNTER — PATIENT MESSAGE (OUTPATIENT)
Dept: INTERNAL MEDICINE CLINIC | Facility: CLINIC | Age: 51
End: 2021-08-17

## 2021-08-17 NOTE — TELEPHONE ENCOUNTER
From: Richerd Holstein  To:  Helen Gordon DO  Sent: 8/17/2021 6:40 AM CDT  Subject: Test Results Question    Here are my results from the first week

## 2021-08-20 RX ORDER — ATORVASTATIN CALCIUM 10 MG/1
TABLET, FILM COATED ORAL
Qty: 90 TABLET | Refills: 2 | Status: SHIPPED | OUTPATIENT
Start: 2021-08-20

## 2021-08-20 RX ORDER — EMPAGLIFLOZIN 25 MG/1
1 TABLET, FILM COATED ORAL DAILY
Qty: 90 TABLET | Refills: 0 | Status: SHIPPED | OUTPATIENT
Start: 2021-08-20 | End: 2021-11-15

## 2021-08-20 RX ORDER — SEMAGLUTIDE 1.34 MG/ML
0.5 INJECTION, SOLUTION SUBCUTANEOUS
Qty: 4.5 ML | Refills: 0 | Status: SHIPPED | OUTPATIENT
Start: 2021-08-20 | End: 2021-09-17

## 2021-08-20 NOTE — TELEPHONE ENCOUNTER
Last visit- 08/09/2021 type 2 diabetes    Last refill- 04/23/2021 jardiance 25mg QTY90 0R,  04/23/2021 atorvastatin 10mg QTY90 0R,  11/30/2020 metformin hcl 1000mg [id number] 1R    Last labs- 07/28/2021 hemoglobin a1c,  03/23/2021 cbc, cmp, lipid  Future Appoi

## 2021-08-21 DIAGNOSIS — I10 ESSENTIAL HYPERTENSION: ICD-10-CM

## 2021-08-21 DIAGNOSIS — E11.9 TYPE 2 DIABETES MELLITUS WITHOUT COMPLICATION, WITHOUT LONG-TERM CURRENT USE OF INSULIN (HCC): ICD-10-CM

## 2021-08-23 RX ORDER — LISINOPRIL AND HYDROCHLOROTHIAZIDE 25; 20 MG/1; MG/1
TABLET ORAL
Qty: 90 TABLET | Refills: 1 | Status: SHIPPED | OUTPATIENT
Start: 2021-08-23

## 2021-08-23 NOTE — TELEPHONE ENCOUNTER
Last visit- 08/09/2021 type 2 diabetes    Last refill- 06/01/2021 lisinopril-hydrochlorothiazide 20-25mg QTY90 1R    Last labs- 07/28/2021 hemoglobin a1c  Future Appointments   Date Time Provider Ernie Valencia   12/9/2021  2:30 PM Faith Houston MD

## 2021-09-27 RX ORDER — BLOOD SUGAR DIAGNOSTIC
STRIP MISCELLANEOUS
Qty: 100 STRIP | Refills: 1 | Status: SHIPPED | OUTPATIENT
Start: 2021-09-27

## 2021-09-27 RX ORDER — LANCETS
EACH MISCELLANEOUS
Qty: 100 EACH | Refills: 1 | Status: SHIPPED | OUTPATIENT
Start: 2021-09-27

## 2021-09-30 RX ORDER — BLOOD-GLUCOSE METER
1 EACH MISCELLANEOUS 2 TIMES DAILY
Qty: 1 KIT | Refills: 0 | Status: SHIPPED | OUTPATIENT
Start: 2021-09-30

## 2021-09-30 NOTE — TELEPHONE ENCOUNTER
Last visit- 08/09/2021 type 2 diabetes    Last refill- 08/09/2021 blood glucose monitoring supply [id number] 0R    Last labs- 07/28/2021 hemoglobin a1c  Future Appointments   Date Time Provider Ernie Valencia   12/9/2021  2:30 PM Gem Breen MD PL [facility]

## 2021-11-15 RX ORDER — EMPAGLIFLOZIN 25 MG/1
1 TABLET, FILM COATED ORAL DAILY
Qty: 90 TABLET | Refills: 0 | Status: SHIPPED | OUTPATIENT
Start: 2021-11-15 | End: 2021-11-30

## 2021-11-15 NOTE — TELEPHONE ENCOUNTER
Last visit- 08/09/2021 type 2 diabetes    Last refill- 08/20/2021 jardiance 25mg QTY90 0R    Last labs- 07/28/2021 hemoglobin a1c    No future appointments.

## 2021-11-30 ENCOUNTER — OFFICE VISIT (OUTPATIENT)
Dept: INTERNAL MEDICINE CLINIC | Facility: CLINIC | Age: 51
End: 2021-11-30

## 2021-11-30 ENCOUNTER — TELEPHONE (OUTPATIENT)
Dept: INTERNAL MEDICINE CLINIC | Facility: CLINIC | Age: 51
End: 2021-11-30

## 2021-11-30 VITALS
WEIGHT: 223.63 LBS | DIASTOLIC BLOOD PRESSURE: 68 MMHG | BODY MASS INDEX: 32.02 KG/M2 | HEIGHT: 70 IN | SYSTOLIC BLOOD PRESSURE: 122 MMHG

## 2021-11-30 DIAGNOSIS — E11.9 CONTROLLED TYPE 2 DIABETES MELLITUS WITHOUT COMPLICATION, WITHOUT LONG-TERM CURRENT USE OF INSULIN (HCC): Primary | ICD-10-CM

## 2021-11-30 PROCEDURE — 3008F BODY MASS INDEX DOCD: CPT | Performed by: FAMILY MEDICINE

## 2021-11-30 PROCEDURE — 90686 IIV4 VACC NO PRSV 0.5 ML IM: CPT | Performed by: FAMILY MEDICINE

## 2021-11-30 PROCEDURE — 99213 OFFICE O/P EST LOW 20 MIN: CPT | Performed by: FAMILY MEDICINE

## 2021-11-30 PROCEDURE — 83036 HEMOGLOBIN GLYCOSYLATED A1C: CPT | Performed by: FAMILY MEDICINE

## 2021-11-30 PROCEDURE — 90471 IMMUNIZATION ADMIN: CPT | Performed by: FAMILY MEDICINE

## 2021-11-30 PROCEDURE — 3078F DIAST BP <80 MM HG: CPT | Performed by: FAMILY MEDICINE

## 2021-11-30 PROCEDURE — 3074F SYST BP LT 130 MM HG: CPT | Performed by: FAMILY MEDICINE

## 2021-11-30 NOTE — PROGRESS NOTES
Subjective:   Patient ID: Ira Herrmann is a 45year old male. HPI Here for f/u on diabetes. Has been feeling great since losing weight. Tolerating Ozempic for the most part- does feel nauseated a little after taking dose. No low sugars.      Histor encounter.       Meds This Visit:  Requested Prescriptions      No prescriptions requested or ordered in this encounter       Imaging & Referrals:  None

## 2021-11-30 NOTE — TELEPHONE ENCOUNTER
Orders to THE [facility]  Pt aware to get labs done no sooner than 2 weeks prior to the appt. Pt aware to fast.  No call back required.   Future Appointments   Date Time Provider Ernie Valencia   4/1/2022  7:00 AM Renee Kamara DO [facility]

## 2022-01-20 RX ORDER — SEMAGLUTIDE 1.34 MG/ML
INJECTION, SOLUTION SUBCUTANEOUS
Qty: 6 ML | Refills: 0 | Status: SHIPPED | OUTPATIENT
Start: 2022-01-20

## 2022-01-20 NOTE — TELEPHONE ENCOUNTER
Medication(s) to Refill:   Requested Prescriptions     Pending Prescriptions Disp Refills   • OZEMPIC, 1 MG/DOSE, 4 MG/3ML Subcutaneous Solution Pen-injector [Pharmacy Med Name: OZEMPIC 1MG PER DOSE (1X4MG PEN)] 6 mL 0     Sig: INJECT 1 MG INTO THE SKIN ON

## 2022-03-14 NOTE — TELEPHONE ENCOUNTER
Last visit- 11/30/2021 controlled type 2 diabetes     Last refill- 01/20/2022 ozempic 1mg/dose 4mg/3ml QTY6ml 0R    Last labs- 11/30/2021 hemoglobin a1c  Future Appointments   Date Time Provider Ernie Valencia   4/1/2022  7:00 AM David White DO [facility]

## 2022-03-15 RX ORDER — SEMAGLUTIDE 1.34 MG/ML
INJECTION, SOLUTION SUBCUTANEOUS
Qty: 6 ML | Refills: 0 | Status: SHIPPED | OUTPATIENT
Start: 2022-03-15

## 2022-03-29 ENCOUNTER — LAB ENCOUNTER (OUTPATIENT)
Dept: LAB | Age: 52
End: 2022-03-29
Attending: FAMILY MEDICINE
Payer: COMMERCIAL

## 2022-03-29 DIAGNOSIS — I10 ESSENTIAL HYPERTENSION: ICD-10-CM

## 2022-03-29 DIAGNOSIS — Z00.00 ANNUAL PHYSICAL EXAM: ICD-10-CM

## 2022-03-29 DIAGNOSIS — E78.2 MIXED HYPERLIPIDEMIA: ICD-10-CM

## 2022-03-29 DIAGNOSIS — Z12.5 SCREENING PSA (PROSTATE SPECIFIC ANTIGEN): ICD-10-CM

## 2022-03-29 DIAGNOSIS — E11.9 CONTROLLED TYPE 2 DIABETES MELLITUS WITHOUT COMPLICATION, WITHOUT LONG-TERM CURRENT USE OF INSULIN (HCC): ICD-10-CM

## 2022-03-29 LAB
ALBUMIN SERPL-MCNC: 4 G/DL (ref 3.4–5)
ALBUMIN/GLOB SERPL: 1.5 {RATIO} (ref 1–2)
ALP LIVER SERPL-CCNC: 42 U/L
ALT SERPL-CCNC: 16 U/L
ANION GAP SERPL CALC-SCNC: 4 MMOL/L (ref 0–18)
AST SERPL-CCNC: 9 U/L (ref 15–37)
BASOPHILS # BLD AUTO: 0.08 X10(3) UL (ref 0–0.2)
BASOPHILS NFR BLD AUTO: 1.2 %
BILIRUB SERPL-MCNC: 0.7 MG/DL (ref 0.1–2)
BUN BLD-MCNC: 23 MG/DL (ref 7–18)
CALCIUM BLD-MCNC: 9.1 MG/DL (ref 8.5–10.1)
CHLORIDE SERPL-SCNC: 107 MMOL/L (ref 98–112)
CHOLEST SERPL-MCNC: 107 MG/DL (ref ?–200)
CO2 SERPL-SCNC: 28 MMOL/L (ref 21–32)
COMPLEXED PSA SERPL-MCNC: 0.72 NG/ML (ref ?–4)
CREAT BLD-MCNC: 1.11 MG/DL
CREAT UR-SCNC: 420 MG/DL
EOSINOPHIL # BLD AUTO: 0.18 X10(3) UL (ref 0–0.7)
EOSINOPHIL NFR BLD AUTO: 2.7 %
ERYTHROCYTE [DISTWIDTH] IN BLOOD BY AUTOMATED COUNT: 12.5 %
EST. AVERAGE GLUCOSE BLD GHB EST-MCNC: 128 MG/DL (ref 68–126)
FASTING PATIENT LIPID ANSWER: YES
FASTING STATUS PATIENT QL REPORTED: YES
GLOBULIN PLAS-MCNC: 2.6 G/DL (ref 2.8–4.4)
GLUCOSE BLD-MCNC: 133 MG/DL (ref 70–99)
HBA1C MFR BLD: 6.1 % (ref ?–5.7)
HCT VFR BLD AUTO: 44.7 %
HDLC SERPL-MCNC: 39 MG/DL (ref 40–59)
HGB BLD-MCNC: 14.9 G/DL
IMM GRANULOCYTES # BLD AUTO: 0.02 X10(3) UL (ref 0–1)
IMM GRANULOCYTES NFR BLD: 0.3 %
LDLC SERPL CALC-MCNC: 53 MG/DL (ref ?–100)
LYMPHOCYTES # BLD AUTO: 2.3 X10(3) UL (ref 1–4)
LYMPHOCYTES NFR BLD AUTO: 34.8 %
MCH RBC QN AUTO: 30.5 PG (ref 26–34)
MCHC RBC AUTO-ENTMCNC: 33.3 G/DL (ref 31–37)
MCV RBC AUTO: 91.4 FL
MICROALBUMIN UR-MCNC: 4.95 MG/DL
MICROALBUMIN/CREAT 24H UR-RTO: 11.8 UG/MG (ref ?–30)
MONOCYTES # BLD AUTO: 0.66 X10(3) UL (ref 0.1–1)
NEUTROPHILS # BLD AUTO: 3.37 X10 (3) UL (ref 1.5–7.7)
NEUTROPHILS # BLD AUTO: 3.37 X10(3) UL (ref 1.5–7.7)
NEUTROPHILS NFR BLD AUTO: 51 %
NONHDLC SERPL-MCNC: 68 MG/DL (ref ?–130)
OSMOLALITY SERPL CALC.SUM OF ELEC: 294 MOSM/KG (ref 275–295)
PLATELET # BLD AUTO: 254 10(3)UL (ref 150–450)
POTASSIUM SERPL-SCNC: 4.2 MMOL/L (ref 3.5–5.1)
PROT SERPL-MCNC: 6.6 G/DL (ref 6.4–8.2)
RBC # BLD AUTO: 4.89 X10(6)UL
SODIUM SERPL-SCNC: 139 MMOL/L (ref 136–145)
TRIGL SERPL-MCNC: 72 MG/DL (ref 30–149)
VLDLC SERPL CALC-MCNC: 10 MG/DL (ref 0–30)
WBC # BLD AUTO: 6.6 X10(3) UL (ref 4–11)

## 2022-03-29 PROCEDURE — 82570 ASSAY OF URINE CREATININE: CPT

## 2022-03-29 PROCEDURE — 36415 COLL VENOUS BLD VENIPUNCTURE: CPT

## 2022-03-29 PROCEDURE — 83036 HEMOGLOBIN GLYCOSYLATED A1C: CPT

## 2022-03-29 PROCEDURE — 80061 LIPID PANEL: CPT

## 2022-03-29 PROCEDURE — 82043 UR ALBUMIN QUANTITATIVE: CPT

## 2022-03-29 PROCEDURE — 85025 COMPLETE CBC W/AUTO DIFF WBC: CPT

## 2022-03-29 PROCEDURE — 3044F HG A1C LEVEL LT 7.0%: CPT | Performed by: FAMILY MEDICINE

## 2022-03-29 PROCEDURE — 3061F NEG MICROALBUMINURIA REV: CPT | Performed by: FAMILY MEDICINE

## 2022-03-29 PROCEDURE — 80053 COMPREHEN METABOLIC PANEL: CPT

## 2022-04-01 ENCOUNTER — OFFICE VISIT (OUTPATIENT)
Dept: INTERNAL MEDICINE CLINIC | Facility: CLINIC | Age: 52
End: 2022-04-01
Payer: COMMERCIAL

## 2022-04-01 VITALS
SYSTOLIC BLOOD PRESSURE: 118 MMHG | WEIGHT: 221.81 LBS | DIASTOLIC BLOOD PRESSURE: 60 MMHG | HEART RATE: 78 BPM | HEIGHT: 70 IN | BODY MASS INDEX: 31.75 KG/M2 | OXYGEN SATURATION: 98 %

## 2022-04-01 DIAGNOSIS — Z00.00 ANNUAL PHYSICAL EXAM: Primary | ICD-10-CM

## 2022-04-01 DIAGNOSIS — E78.2 MIXED HYPERLIPIDEMIA: ICD-10-CM

## 2022-04-01 DIAGNOSIS — I10 ESSENTIAL HYPERTENSION: ICD-10-CM

## 2022-04-01 DIAGNOSIS — E11.9 CONTROLLED TYPE 2 DIABETES MELLITUS WITHOUT COMPLICATION, WITHOUT LONG-TERM CURRENT USE OF INSULIN (HCC): ICD-10-CM

## 2022-04-01 PROCEDURE — 3074F SYST BP LT 130 MM HG: CPT | Performed by: FAMILY MEDICINE

## 2022-04-01 PROCEDURE — 99396 PREV VISIT EST AGE 40-64: CPT | Performed by: FAMILY MEDICINE

## 2022-04-01 PROCEDURE — 3078F DIAST BP <80 MM HG: CPT | Performed by: FAMILY MEDICINE

## 2022-04-01 PROCEDURE — 3008F BODY MASS INDEX DOCD: CPT | Performed by: FAMILY MEDICINE

## 2022-05-02 RX ORDER — LISINOPRIL AND HYDROCHLOROTHIAZIDE 25; 20 MG/1; MG/1
TABLET ORAL
Qty: 90 TABLET | Refills: 1 | Status: SHIPPED | OUTPATIENT
Start: 2022-05-02

## 2022-05-02 RX ORDER — ATORVASTATIN CALCIUM 10 MG/1
TABLET, FILM COATED ORAL
Qty: 90 TABLET | Refills: 2 | Status: SHIPPED | OUTPATIENT
Start: 2022-05-02

## 2022-05-02 NOTE — TELEPHONE ENCOUNTER
Last visit- 04/01/2022 cpe     Last refill- 08/20/2021 atorvastatin 10mg QTY90 2R,  08/23/2021 lisinopril-hydrochlorothiazide 20-25mg QTY90 1R    Last labs- 03/29/2022 cmp, cbc, lipid, psa screen, hemoglobin a1c, microalb   Future Appointments   Date Time Provider Ernie Valencia   10/4/2022  7:00 AM Ignacio Willson DO [facility]

## 2022-05-12 NOTE — TELEPHONE ENCOUNTER
Last visit- 04/01/2022 cpe     Last refill- 03/15/2022 ozempic 1mg QTY6ml 0R    Last labs- 03/29/2022 cmp, cbc, lipid, psa screen, hemoglobin a1c, microalb  Future Appointments   Date Time Provider Ernie Valencia   10/4/2022  7:00 AM Neptali Sep, DO [facility]

## 2022-05-13 RX ORDER — SEMAGLUTIDE 1.34 MG/ML
INJECTION, SOLUTION SUBCUTANEOUS
Qty: 6 ML | Refills: 0 | Status: SHIPPED | OUTPATIENT
Start: 2022-05-13

## 2022-07-06 ENCOUNTER — TELEPHONE (OUTPATIENT)
Dept: INTERNAL MEDICINE CLINIC | Facility: CLINIC | Age: 52
End: 2022-07-06

## 2022-07-06 RX ORDER — SEMAGLUTIDE 1.34 MG/ML
INJECTION, SOLUTION SUBCUTANEOUS
Qty: 6 ML | Refills: 1 | Status: SHIPPED | OUTPATIENT
Start: 2022-07-06

## 2022-07-06 NOTE — TELEPHONE ENCOUNTER
Last visit- 04/01/2022 cpe     Last refill- 05/13/2022 ozempic 1mg/dose QTY6ml 0R    Last labs- 03/29/2022 cmp, cbc, lipid, psa screen, hemoglobin a1c, microalb  Future Appointments   Date Time Provider Ernie Valencia   10/4/2022  7:00 AM Jeremy Pretty, DO [facility]

## 2022-08-25 ENCOUNTER — PATIENT MESSAGE (OUTPATIENT)
Dept: INTERNAL MEDICINE CLINIC | Facility: CLINIC | Age: 52
End: 2022-08-25

## 2022-08-25 NOTE — TELEPHONE ENCOUNTER
From: Mabel Blackmon  To: Briana Rojo,   Sent: 8/25/2022 9:23 AM CDT  Subject: Perscription     is there anyway to get a higher quanity of Ozempic 1mg Per Dose (1x4mg Pen) instead of getting this refilled every month, so i can pay a lower co-pay? Or is this the only way it comes?    Thanks  cindy

## 2022-08-27 RX ORDER — SEMAGLUTIDE 1.34 MG/ML
1 INJECTION, SOLUTION SUBCUTANEOUS WEEKLY
Qty: 9 ML | Refills: 1 | Status: SHIPPED | OUTPATIENT
Start: 2022-08-27

## 2022-09-21 LAB
AMB EXT CHOL/HDL RATIO: 2.75
AMB EXT CHOLESTEROL, TOTAL: 113 MG/DL
AMB EXT GLUCOSE: 129 MG/DL
AMB EXT HDL CHOLESTEROL: 41 MG/DL
AMB EXT LDL CHOLESTEROL, DIRECT: 58 MG/DL
AMB EXT TRIGLYCERIDES: 68 MG/DL
AMB EXT VLDL: 13 MG/DL

## 2022-09-27 ENCOUNTER — PATIENT MESSAGE (OUTPATIENT)
Dept: INTERNAL MEDICINE CLINIC | Facility: CLINIC | Age: 52
End: 2022-09-27

## 2022-09-28 ENCOUNTER — TELEPHONE (OUTPATIENT)
Dept: INTERNAL MEDICINE CLINIC | Facility: CLINIC | Age: 52
End: 2022-09-28

## 2022-09-28 NOTE — TELEPHONE ENCOUNTER
Called pt to inform I will update lab report he sent but he does not need any fasting labs prior to appointment. Explained to pt his appointment is for diabetes f/u and we will only do a fingerstick a1c. Pt understood and had no further questions.

## 2022-09-28 NOTE — TELEPHONE ENCOUNTER
From: Wiflredo Gonzales  To: Salina Claude, DO  Sent: 9/27/2022 1:58 PM CDT  Subject: Blood work    I just had the attached completed for my wife's insurance. Do I still need to get blood tested before my next visit or will this be sufficent?

## 2022-09-28 NOTE — TELEPHONE ENCOUNTER
Received and abstracted lab report collected on 09/21/2022. Placed in AMS bin to review. Will send to scan once signed.

## 2022-10-04 ENCOUNTER — OFFICE VISIT (OUTPATIENT)
Dept: INTERNAL MEDICINE CLINIC | Facility: CLINIC | Age: 52
End: 2022-10-04
Payer: COMMERCIAL

## 2022-10-04 VITALS
OXYGEN SATURATION: 98 % | SYSTOLIC BLOOD PRESSURE: 112 MMHG | HEART RATE: 87 BPM | BODY MASS INDEX: 31.64 KG/M2 | WEIGHT: 221 LBS | DIASTOLIC BLOOD PRESSURE: 80 MMHG | HEIGHT: 70 IN | RESPIRATION RATE: 18 BRPM

## 2022-10-04 DIAGNOSIS — M67.479 GANGLION CYST OF FOOT: ICD-10-CM

## 2022-10-04 DIAGNOSIS — E11.9 TYPE 2 DIABETES MELLITUS WITHOUT COMPLICATION, WITHOUT LONG-TERM CURRENT USE OF INSULIN (HCC): Primary | ICD-10-CM

## 2022-10-04 DIAGNOSIS — I10 ESSENTIAL HYPERTENSION: ICD-10-CM

## 2022-10-04 DIAGNOSIS — N48.89 PENILE IRRITATION: ICD-10-CM

## 2022-10-04 DIAGNOSIS — E78.2 MIXED HYPERLIPIDEMIA: ICD-10-CM

## 2022-10-04 LAB
CARTRIDGE LOT#: 505 NUMERIC
HEMOGLOBIN A1C: 5.8 % (ref 4.3–5.6)

## 2022-10-04 PROCEDURE — 3008F BODY MASS INDEX DOCD: CPT | Performed by: FAMILY MEDICINE

## 2022-10-04 PROCEDURE — 3079F DIAST BP 80-89 MM HG: CPT | Performed by: FAMILY MEDICINE

## 2022-10-04 PROCEDURE — 3044F HG A1C LEVEL LT 7.0%: CPT | Performed by: FAMILY MEDICINE

## 2022-10-04 PROCEDURE — 99214 OFFICE O/P EST MOD 30 MIN: CPT | Performed by: FAMILY MEDICINE

## 2022-10-04 PROCEDURE — 90686 IIV4 VACC NO PRSV 0.5 ML IM: CPT | Performed by: FAMILY MEDICINE

## 2022-10-04 PROCEDURE — 83036 HEMOGLOBIN GLYCOSYLATED A1C: CPT | Performed by: FAMILY MEDICINE

## 2022-10-04 PROCEDURE — 3074F SYST BP LT 130 MM HG: CPT | Performed by: FAMILY MEDICINE

## 2022-10-04 PROCEDURE — 90471 IMMUNIZATION ADMIN: CPT | Performed by: FAMILY MEDICINE

## 2022-10-04 NOTE — PATIENT INSTRUCTIONS
Continue current medication. Use Tucks wipes for irritation. Schedule appointment with Podiatrist for your foot.

## 2022-10-27 NOTE — TELEPHONE ENCOUNTER
Last visit- 10/04/2022 type 2 diabetes    Last refill- 04/08/2022 metformin hcl 1000mg [id number] 1R    Last labs- 10/04/2022 hemoglobin a1c    No future appointments.

## 2023-01-25 DIAGNOSIS — I10 ESSENTIAL HYPERTENSION: ICD-10-CM

## 2023-01-25 DIAGNOSIS — E11.9 TYPE 2 DIABETES MELLITUS WITHOUT COMPLICATION, WITHOUT LONG-TERM CURRENT USE OF INSULIN (HCC): ICD-10-CM

## 2023-01-25 RX ORDER — LISINOPRIL AND HYDROCHLOROTHIAZIDE 25; 20 MG/1; MG/1
TABLET ORAL
Qty: 90 TABLET | Refills: 0 | Status: SHIPPED | OUTPATIENT
Start: 2023-01-25

## 2023-01-25 RX ORDER — ATORVASTATIN CALCIUM 10 MG/1
TABLET, FILM COATED ORAL
Qty: 90 TABLET | Refills: 0 | Status: SHIPPED | OUTPATIENT
Start: 2023-01-25

## 2023-02-16 RX ORDER — SEMAGLUTIDE 1.34 MG/ML
1 INJECTION, SOLUTION SUBCUTANEOUS WEEKLY
Qty: 9 ML | Refills: 0 | Status: SHIPPED | OUTPATIENT
Start: 2023-02-16

## 2023-02-20 ENCOUNTER — TELEPHONE (OUTPATIENT)
Dept: INTERNAL MEDICINE CLINIC | Facility: CLINIC | Age: 53
End: 2023-02-20

## 2023-02-20 NOTE — TELEPHONE ENCOUNTER
[id number] - [address] - [address], [phone], [phone]    Pt stated pharm told him ins needs override approval from the doctor. Pt stated he's due for injection tomorrow. semaglutide (OZEMPIC, 1 MG/DOSE,) 4 MG/3ML Subcutaneous Solution Pen-injector 9 mL 0 2/16/2023     Sig - Route: Inject 1 mg into the skin once a week.  - Subcutaneous    Sent to pharmacy as: Ozempic (1 MG/DOSE) 4 MG/3ML

## 2023-02-21 NOTE — TELEPHONE ENCOUNTER
Approval letter for medication was received. Called both pharmacy and pt to inform. No further action needed.

## 2023-02-21 NOTE — TELEPHONE ENCOUNTER
Prior authorization initiated. Waiting on response. Called pt and left a message informing him of this. Advised pt to return call if he had further questions. Response should be received at the end of day today.

## 2023-05-09 DIAGNOSIS — E78.2 MIXED HYPERLIPIDEMIA: ICD-10-CM

## 2023-05-09 DIAGNOSIS — Z12.5 SCREENING PSA (PROSTATE SPECIFIC ANTIGEN): ICD-10-CM

## 2023-05-09 DIAGNOSIS — Z13.0 SCREENING FOR DEFICIENCY ANEMIA: ICD-10-CM

## 2023-05-09 DIAGNOSIS — E11.9 TYPE 2 DIABETES MELLITUS WITHOUT COMPLICATION, WITHOUT LONG-TERM CURRENT USE OF INSULIN (HCC): Primary | ICD-10-CM

## 2023-05-10 RX ORDER — SEMAGLUTIDE 1.34 MG/ML
1 INJECTION, SOLUTION SUBCUTANEOUS WEEKLY
Qty: 9 ML | Refills: 0 | Status: SHIPPED | OUTPATIENT
Start: 2023-05-10

## 2023-05-10 NOTE — TELEPHONE ENCOUNTER
Please call patient to schedule annual check-up- he's overdue for a visit. Fasting lab orders are at [facility]. This includes urine test too.

## 2023-05-11 NOTE — TELEPHONE ENCOUNTER
Future Appointments   Date Time Provider Ernie Valencia   5/23/2023  7:30 AM Mark Peter, DO [facility]

## 2023-05-18 ENCOUNTER — LAB ENCOUNTER (OUTPATIENT)
Dept: LAB | Age: 53
End: 2023-05-18
Attending: FAMILY MEDICINE
Payer: COMMERCIAL

## 2023-05-18 DIAGNOSIS — Z13.0 SCREENING FOR DEFICIENCY ANEMIA: ICD-10-CM

## 2023-05-18 DIAGNOSIS — Z12.5 SCREENING PSA (PROSTATE SPECIFIC ANTIGEN): ICD-10-CM

## 2023-05-18 DIAGNOSIS — E11.9 TYPE 2 DIABETES MELLITUS WITHOUT COMPLICATION, WITHOUT LONG-TERM CURRENT USE OF INSULIN (HCC): ICD-10-CM

## 2023-05-18 DIAGNOSIS — E78.2 MIXED HYPERLIPIDEMIA: ICD-10-CM

## 2023-05-18 LAB
ALBUMIN SERPL-MCNC: 3.9 G/DL (ref 3.4–5)
ALBUMIN/GLOB SERPL: 1.3 {RATIO} (ref 1–2)
ALP LIVER SERPL-CCNC: 42 U/L
ALT SERPL-CCNC: 20 U/L
ANION GAP SERPL CALC-SCNC: 4 MMOL/L (ref 0–18)
AST SERPL-CCNC: 14 U/L (ref 15–37)
BASOPHILS # BLD AUTO: 0.07 X10(3) UL (ref 0–0.2)
BASOPHILS NFR BLD AUTO: 1.1 %
BILIRUB SERPL-MCNC: 0.7 MG/DL (ref 0.1–2)
BUN BLD-MCNC: 29 MG/DL (ref 7–18)
CALCIUM BLD-MCNC: 9.2 MG/DL (ref 8.5–10.1)
CHLORIDE SERPL-SCNC: 108 MMOL/L (ref 98–112)
CHOLEST SERPL-MCNC: 115 MG/DL (ref ?–200)
CO2 SERPL-SCNC: 25 MMOL/L (ref 21–32)
COMPLEXED PSA SERPL-MCNC: 1.27 NG/ML (ref ?–4)
CREAT BLD-MCNC: 1.16 MG/DL
CREAT UR-SCNC: 338 MG/DL
EOSINOPHIL # BLD AUTO: 0.18 X10(3) UL (ref 0–0.7)
EOSINOPHIL NFR BLD AUTO: 2.9 %
ERYTHROCYTE [DISTWIDTH] IN BLOOD BY AUTOMATED COUNT: 12.4 %
EST. AVERAGE GLUCOSE BLD GHB EST-MCNC: 128 MG/DL (ref 68–126)
FASTING PATIENT LIPID ANSWER: YES
FASTING STATUS PATIENT QL REPORTED: YES
GFR SERPLBLD BASED ON 1.73 SQ M-ARVRAT: 75 ML/MIN/1.73M2 (ref 60–?)
GLOBULIN PLAS-MCNC: 3 G/DL (ref 2.8–4.4)
GLUCOSE BLD-MCNC: 131 MG/DL (ref 70–99)
HBA1C MFR BLD: 6.1 % (ref ?–5.7)
HCT VFR BLD AUTO: 43.3 %
HDLC SERPL-MCNC: 41 MG/DL (ref 40–59)
HGB BLD-MCNC: 14.9 G/DL
IMM GRANULOCYTES # BLD AUTO: 0.04 X10(3) UL (ref 0–1)
IMM GRANULOCYTES NFR BLD: 0.6 %
LDLC SERPL CALC-MCNC: 61 MG/DL (ref ?–100)
LYMPHOCYTES # BLD AUTO: 1.82 X10(3) UL (ref 1–4)
LYMPHOCYTES NFR BLD AUTO: 29.5 %
MCH RBC QN AUTO: 31.5 PG (ref 26–34)
MCHC RBC AUTO-ENTMCNC: 34.4 G/DL (ref 31–37)
MCV RBC AUTO: 91.5 FL
MICROALBUMIN UR-MCNC: 2.28 MG/DL
MICROALBUMIN/CREAT 24H UR-RTO: 6.7 UG/MG (ref ?–30)
MONOCYTES # BLD AUTO: 0.59 X10(3) UL (ref 0.1–1)
MONOCYTES NFR BLD AUTO: 9.6 %
NEUTROPHILS # BLD AUTO: 3.47 X10 (3) UL (ref 1.5–7.7)
NEUTROPHILS # BLD AUTO: 3.47 X10(3) UL (ref 1.5–7.7)
NEUTROPHILS NFR BLD AUTO: 56.3 %
NONHDLC SERPL-MCNC: 74 MG/DL (ref ?–130)
OSMOLALITY SERPL CALC.SUM OF ELEC: 292 MOSM/KG (ref 275–295)
PLATELET # BLD AUTO: 255 10(3)UL (ref 150–450)
POTASSIUM SERPL-SCNC: 3.9 MMOL/L (ref 3.5–5.1)
PROT SERPL-MCNC: 6.9 G/DL (ref 6.4–8.2)
RBC # BLD AUTO: 4.73 X10(6)UL
SODIUM SERPL-SCNC: 137 MMOL/L (ref 136–145)
TRIGL SERPL-MCNC: 60 MG/DL (ref 30–149)
VLDLC SERPL CALC-MCNC: 9 MG/DL (ref 0–30)
WBC # BLD AUTO: 6.2 X10(3) UL (ref 4–11)

## 2023-05-18 PROCEDURE — 3044F HG A1C LEVEL LT 7.0%: CPT | Performed by: FAMILY MEDICINE

## 2023-05-18 PROCEDURE — 83036 HEMOGLOBIN GLYCOSYLATED A1C: CPT

## 2023-05-18 PROCEDURE — 85025 COMPLETE CBC W/AUTO DIFF WBC: CPT

## 2023-05-18 PROCEDURE — 36415 COLL VENOUS BLD VENIPUNCTURE: CPT

## 2023-05-18 PROCEDURE — 80061 LIPID PANEL: CPT

## 2023-05-18 PROCEDURE — 80053 COMPREHEN METABOLIC PANEL: CPT

## 2023-05-18 PROCEDURE — 82570 ASSAY OF URINE CREATININE: CPT

## 2023-05-18 PROCEDURE — 82043 UR ALBUMIN QUANTITATIVE: CPT

## 2023-05-18 PROCEDURE — 3061F NEG MICROALBUMINURIA REV: CPT | Performed by: FAMILY MEDICINE

## 2023-05-23 ENCOUNTER — OFFICE VISIT (OUTPATIENT)
Dept: INTERNAL MEDICINE CLINIC | Facility: CLINIC | Age: 53
End: 2023-05-23
Payer: COMMERCIAL

## 2023-05-23 VITALS
HEIGHT: 70 IN | SYSTOLIC BLOOD PRESSURE: 120 MMHG | BODY MASS INDEX: 31.21 KG/M2 | OXYGEN SATURATION: 98 % | HEART RATE: 71 BPM | RESPIRATION RATE: 17 BRPM | WEIGHT: 218 LBS | TEMPERATURE: 98 F | DIASTOLIC BLOOD PRESSURE: 70 MMHG

## 2023-05-23 DIAGNOSIS — E78.2 MIXED HYPERLIPIDEMIA: ICD-10-CM

## 2023-05-23 DIAGNOSIS — I10 ESSENTIAL HYPERTENSION: ICD-10-CM

## 2023-05-23 DIAGNOSIS — E11.9 TYPE 2 DIABETES MELLITUS WITHOUT COMPLICATION, WITHOUT LONG-TERM CURRENT USE OF INSULIN (HCC): ICD-10-CM

## 2023-05-23 DIAGNOSIS — Z00.00 ANNUAL PHYSICAL EXAM: Primary | ICD-10-CM

## 2023-05-23 PROCEDURE — 3074F SYST BP LT 130 MM HG: CPT | Performed by: FAMILY MEDICINE

## 2023-05-23 PROCEDURE — 3078F DIAST BP <80 MM HG: CPT | Performed by: FAMILY MEDICINE

## 2023-05-23 PROCEDURE — 3008F BODY MASS INDEX DOCD: CPT | Performed by: FAMILY MEDICINE

## 2023-05-23 PROCEDURE — 99396 PREV VISIT EST AGE 40-64: CPT | Performed by: FAMILY MEDICINE

## 2023-06-28 DIAGNOSIS — I10 ESSENTIAL HYPERTENSION: ICD-10-CM

## 2023-06-28 DIAGNOSIS — E11.9 TYPE 2 DIABETES MELLITUS WITHOUT COMPLICATION, WITHOUT LONG-TERM CURRENT USE OF INSULIN (HCC): ICD-10-CM

## 2023-06-29 RX ORDER — LISINOPRIL AND HYDROCHLOROTHIAZIDE 25; 20 MG/1; MG/1
1 TABLET ORAL DAILY
Qty: 90 TABLET | Refills: 1 | Status: SHIPPED | OUTPATIENT
Start: 2023-06-29

## 2023-09-22 ENCOUNTER — TELEPHONE (OUTPATIENT)
Dept: INTERNAL MEDICINE CLINIC | Facility: CLINIC | Age: 53
End: 2023-09-22

## 2023-09-22 NOTE — TELEPHONE ENCOUNTER
Patient Eye exam. [address]. Dereje Lawler. Placed in [address] for review. Will send to scan after.

## 2023-09-29 RX ORDER — SEMAGLUTIDE 1.34 MG/ML
1 INJECTION, SOLUTION SUBCUTANEOUS WEEKLY
Qty: 6 ML | Refills: 0 | Status: SHIPPED | OUTPATIENT
Start: 2023-09-29

## 2023-10-02 ENCOUNTER — OFFICE VISIT (OUTPATIENT)
Dept: FAMILY MEDICINE CLINIC | Facility: CLINIC | Age: 53
End: 2023-10-02
Payer: COMMERCIAL

## 2023-10-02 VITALS
TEMPERATURE: 97 F | OXYGEN SATURATION: 97 % | BODY MASS INDEX: 30.78 KG/M2 | HEIGHT: 70 IN | WEIGHT: 215 LBS | SYSTOLIC BLOOD PRESSURE: 104 MMHG | DIASTOLIC BLOOD PRESSURE: 68 MMHG | RESPIRATION RATE: 16 BRPM | HEART RATE: 86 BPM

## 2023-10-02 DIAGNOSIS — J02.9 SORE THROAT: Primary | ICD-10-CM

## 2023-10-02 LAB
CONTROL LINE PRESENT WITH A CLEAR BACKGROUND (YES/NO): YES YES/NO
KIT LOT #: NORMAL NUMERIC
STREP GRP A CUL-SCR: NEGATIVE

## 2023-10-02 PROCEDURE — 87880 STREP A ASSAY W/OPTIC: CPT | Performed by: FAMILY MEDICINE

## 2023-10-02 PROCEDURE — 3074F SYST BP LT 130 MM HG: CPT | Performed by: FAMILY MEDICINE

## 2023-10-02 PROCEDURE — 3078F DIAST BP <80 MM HG: CPT | Performed by: FAMILY MEDICINE

## 2023-10-02 PROCEDURE — 99213 OFFICE O/P EST LOW 20 MIN: CPT | Performed by: FAMILY MEDICINE

## 2023-10-02 PROCEDURE — 3008F BODY MASS INDEX DOCD: CPT | Performed by: FAMILY MEDICINE

## 2023-10-02 RX ORDER — AMOXICILLIN 875 MG/1
875 TABLET, COATED ORAL 2 TIMES DAILY
Qty: 20 TABLET | Refills: 0 | Status: SHIPPED | OUTPATIENT
Start: 2023-10-02 | End: 2023-10-12

## 2023-10-02 NOTE — PATIENT INSTRUCTIONS
Take antibiotics with food and plenty of water. Eat yogurt or take probiotic daily. (Micah Bruno is a good example of an OTC probiotic)  Make sure to finish the entire antibiotic treatment. Increase fluids and rest.   Use otc meds as needed. Monitor symptoms and contact the office if no better in 2-3 days.

## 2023-11-07 RX ORDER — ATORVASTATIN CALCIUM 10 MG/1
TABLET, FILM COATED ORAL
Qty: 90 TABLET | Refills: 1 | Status: SHIPPED | OUTPATIENT
Start: 2023-11-07

## 2023-11-07 NOTE — TELEPHONE ENCOUNTER
Requested Prescriptions     Pending Prescriptions Disp Refills    ATORVASTATIN 10 MG Oral Tab [Pharmacy Med Name: ATORVASTATIN 10MG TABLETS] 90 tablet 0     Sig: TAKE 1 TABLET(10 MG) BY MOUTH EVERY NIGHT       LOV:  5--physical    LAST CPE: 5--physical    Last Labs: 5--lipid,cmp,cbc,tsh,a1c    Last Refill: 1--90 tabs with 0 refills     Your appointments       Date & Time Appointment Department [facility])    Nov 21, 2023  7:00 AM CST Follow Up Visit with Yosvany Reddy DO [address],[place] [address], [facility] ([facility])              [address],[facility] [address], [facility]  [facility] [employer] [facility]  [address] [place] [address] [address] [facility] [address] [facility]

## 2023-11-27 RX ORDER — SEMAGLUTIDE 1.34 MG/ML
1 INJECTION, SOLUTION SUBCUTANEOUS WEEKLY
Qty: 6 ML | Refills: 0 | Status: SHIPPED | OUTPATIENT
Start: 2023-11-27

## 2023-11-27 NOTE — TELEPHONE ENCOUNTER
Future Appointments   Date Time Provider Ernie Valencia   12/12/2023  7:00 AM Awilda Pascal, DO [facility] [employer]   ]

## 2023-11-27 NOTE — TELEPHONE ENCOUNTER
Requested Prescriptions     Pending Prescriptions Disp Refills    OZEMPIC, 1 MG/DOSE, 4 MG/3ML Subcutaneous Solution Pen-injector [Pharmacy Med Name: OZEMPIC 1MG PER DOSE (1X4MG PEN)] 6 mL 0     Sig: INJECT 1 MG UNDER THE SKIN ONCE A WEEK. LOV:  5--physical    LAST CPE: 5--physical    Last LTUL:8--E9V;VZEHN,WCPUC,TDH    Last Refill: 9-- 6 ml = 56 days with 0 refills         A1c at goal. Continue current meds. Recheck A1c in 6 months. If more frequent low sugars, patient to contact office to decrease Metformin.

## 2023-12-12 ENCOUNTER — OFFICE VISIT (OUTPATIENT)
Dept: INTERNAL MEDICINE CLINIC | Facility: CLINIC | Age: 53
End: 2023-12-12
Payer: COMMERCIAL

## 2023-12-12 VITALS
HEART RATE: 87 BPM | OXYGEN SATURATION: 97 % | DIASTOLIC BLOOD PRESSURE: 74 MMHG | HEIGHT: 70 IN | WEIGHT: 218 LBS | SYSTOLIC BLOOD PRESSURE: 106 MMHG | BODY MASS INDEX: 31.21 KG/M2

## 2023-12-12 DIAGNOSIS — I10 ESSENTIAL HYPERTENSION: ICD-10-CM

## 2023-12-12 DIAGNOSIS — E11.9 CONTROLLED TYPE 2 DIABETES MELLITUS WITHOUT COMPLICATION, WITHOUT LONG-TERM CURRENT USE OF INSULIN (HCC): Primary | ICD-10-CM

## 2023-12-12 LAB
CARTRIDGE LOT#: 634 NUMERIC
HEMOGLOBIN A1C: 6.1 % (ref 4.3–5.6)

## 2023-12-12 PROCEDURE — 99214 OFFICE O/P EST MOD 30 MIN: CPT | Performed by: FAMILY MEDICINE

## 2023-12-12 PROCEDURE — 90686 IIV4 VACC NO PRSV 0.5 ML IM: CPT | Performed by: FAMILY MEDICINE

## 2023-12-12 PROCEDURE — 3074F SYST BP LT 130 MM HG: CPT | Performed by: FAMILY MEDICINE

## 2023-12-12 PROCEDURE — 3008F BODY MASS INDEX DOCD: CPT | Performed by: FAMILY MEDICINE

## 2023-12-12 PROCEDURE — 3078F DIAST BP <80 MM HG: CPT | Performed by: FAMILY MEDICINE

## 2023-12-12 PROCEDURE — 3044F HG A1C LEVEL LT 7.0%: CPT | Performed by: FAMILY MEDICINE

## 2023-12-12 PROCEDURE — 90471 IMMUNIZATION ADMIN: CPT | Performed by: FAMILY MEDICINE

## 2023-12-12 PROCEDURE — 83036 HEMOGLOBIN GLYCOSYLATED A1C: CPT | Performed by: FAMILY MEDICINE

## 2023-12-12 RX ORDER — SEMAGLUTIDE 1.34 MG/ML
1 INJECTION, SOLUTION SUBCUTANEOUS WEEKLY
Qty: 9 ML | Refills: 1 | Status: SHIPPED | OUTPATIENT
Start: 2023-12-12

## 2023-12-12 NOTE — PROGRESS NOTES
Subjective:   Patient ID: Radha Vela is a 46year old male. HPI Here for f/u on diabetes. Patient is taking meds as prescribed with no issues. No issues with BP med. No complaints. History/Other:   Past Medical History:   Diagnosis Date    Acute conjunctivitis 10/2014    Allergic rhinitis 04/2013    CAD (coronary artery disease) 02/2010    Cellulitis     Dehydration 2012    Diabetes ([address] 01/02/2015    Diabetes mellitus, type II ([facility] 10/2012    Diarrhea 2012    Dysfunctions associated with sleep stages or arousal from sleep 2009    Effusion of lower leg joint 12/16/2014    Hiatal hernia     High blood pressure     Hyperglycemia     Hyperlipidemia 03/12/2015    Internal derangement of knee joint 07/19/2013    Left Knee     Mitral valve prolapse     Obesity 1/1/2114    Osteoarthrosis, unspecified whether generalized or localized, lower leg 07/2013    L leg    Sensorineural hearing loss 04/2013    sensory hearing loss unilaterally, Rt ear    Skin tag 10/2012    Snoring     Unspecified essential hypertension     Visual impairment     glasses    Vomiting 2012       Review of Systems   Respiratory:  Negative for chest tightness and shortness of breath. Cardiovascular:  Negative for chest pain and palpitations. Neurological:  Negative for dizziness and headaches. Current Outpatient Medications   Medication Sig Dispense Refill    OZEMPIC, 1 MG/DOSE, 4 MG/3ML Subcutaneous Solution Pen-injector INJECT 1 MG UNDER THE SKIN ONCE A WEEK. 6 mL 0    atorvastatin 10 MG Oral Tab TAKE 1 TABLET(10 MG) BY MOUTH EVERY NIGHT 90 tablet 1    metFORMIN HCl 1000 MG Oral Tab TAKE 1 TABLET BY MOUTH TWICE DAILY WITH MEALS 180 tablet 1    lisinopril-hydroCHLOROthiazide 20-25 MG Oral Tab Take 1 tablet by mouth daily. 90 tablet 1    Insulin Pen Needle (PEN NEEDLES) 32G X 4 MM Does not apply Misc 1 each by Does not apply route every 7 days.  30 each 0     Allergies:No Known Allergies    Objective:   Physical Exam  Vitals reviewed. Constitutional:       Appearance: Normal appearance. He is well-developed. HENT:      Head: Normocephalic and atraumatic. Cardiovascular:      Rate and Rhythm: Normal rate and regular rhythm. Heart sounds: Normal heart sounds. Pulmonary:      Effort: Pulmonary effort is normal.      Breath sounds: Normal breath sounds. Neurological:      Mental Status: He is alert. Psychiatric:         Mood and Affect: Mood normal.         Behavior: Behavior normal.         Assessment & Plan:   1. Controlled type 2 diabetes mellitus without complication, without long-term current use of insulin ([address]    2. Essential hypertension    A1c at goal. Continue current med. Recheck A1c in 6 months. BP controlled on med. Continue. No orders of the defined types were placed in this encounter.       Meds This Visit:  Requested Prescriptions      No prescriptions requested or ordered in this encounter       Imaging & Referrals:  None

## 2023-12-17 ENCOUNTER — OFFICE VISIT (OUTPATIENT)
Dept: FAMILY MEDICINE CLINIC | Facility: CLINIC | Age: 53
End: 2023-12-17
Payer: COMMERCIAL

## 2023-12-17 VITALS
WEIGHT: 215 LBS | TEMPERATURE: 98 F | OXYGEN SATURATION: 97 % | HEIGHT: 70 IN | SYSTOLIC BLOOD PRESSURE: 106 MMHG | HEART RATE: 75 BPM | RESPIRATION RATE: 18 BRPM | BODY MASS INDEX: 30.78 KG/M2 | DIASTOLIC BLOOD PRESSURE: 80 MMHG

## 2023-12-17 DIAGNOSIS — B34.9 ACUTE VIRAL SYNDROME: Primary | ICD-10-CM

## 2023-12-17 DIAGNOSIS — Z86.79 HISTORY OF CAD (CORONARY ARTERY DISEASE): ICD-10-CM

## 2023-12-17 DIAGNOSIS — Z86.79 HISTORY OF ESSENTIAL HYPERTENSION: ICD-10-CM

## 2023-12-17 DIAGNOSIS — Z86.39 HISTORY OF DIABETES MELLITUS, TYPE II: ICD-10-CM

## 2023-12-17 PROCEDURE — 87637 SARSCOV2&INF A&B&RSV AMP PRB: CPT | Performed by: PHYSICIAN ASSISTANT

## 2023-12-17 PROCEDURE — 99214 OFFICE O/P EST MOD 30 MIN: CPT | Performed by: PHYSICIAN ASSISTANT

## 2023-12-17 PROCEDURE — 3074F SYST BP LT 130 MM HG: CPT | Performed by: PHYSICIAN ASSISTANT

## 2023-12-17 PROCEDURE — 3008F BODY MASS INDEX DOCD: CPT | Performed by: PHYSICIAN ASSISTANT

## 2023-12-17 PROCEDURE — 3079F DIAST BP 80-89 MM HG: CPT | Performed by: PHYSICIAN ASSISTANT

## 2023-12-17 RX ORDER — OSELTAMIVIR PHOSPHATE 75 MG/1
75 CAPSULE ORAL 2 TIMES DAILY
Qty: 10 CAPSULE | Refills: 0 | Status: SHIPPED | OUTPATIENT
Start: 2023-12-17 | End: 2023-12-22

## 2023-12-17 RX ORDER — ALBUTEROL SULFATE 90 UG/1
2 AEROSOL, METERED RESPIRATORY (INHALATION) EVERY 6 HOURS PRN
Qty: 1 EACH | Refills: 0 | Status: SHIPPED | OUTPATIENT
Start: 2023-12-17

## 2023-12-17 RX ORDER — BENZONATATE 200 MG/1
200 CAPSULE ORAL 3 TIMES DAILY PRN
Qty: 30 CAPSULE | Refills: 0 | Status: SHIPPED | OUTPATIENT
Start: 2023-12-17

## 2023-12-17 NOTE — PATIENT INSTRUCTIONS
Quad viral panel pending, (RSV, Influenza A/B, COVID-19),  Results available in 24 hours. Tamfilu 75 mg twice daily to cover for suspected influenza given household contact (your son). If your influenza test is negative, may continue at present dose or reduce to 75m g daily for 10 days as prophylactic therapy (to prevent influenza). If you are positive for COVID-19, you are eligilble for Paxlovid, however will need to review medications and potential interactions with Paxlovid (including Tamiflu) to ensure no issues. Benzonatate 200 mg up to three times daily as needed to stop cough. If cough is productive, may use plain Mucinex (no decongestants) during daytime, and then take single dose of benzonatate at bedtime. If cough becomes dry--stop Mucinex and take benzonatate 200 mg up to three times daily as prescribed. Albuterol 2 puffs inhaled every 4 to 6 hours as needed for cough/shortness of breath. In event you or you son test positive for COVID-19 or influenza, consider contacting your daughter's primary care provider regarding eligibility for prophylactic (preventive) therapy. Follow up with your primary care provider if your symptoms fail to improve/resolve as anticipated. Go to the ER or immediate care with new or worsening symptoms at any time.

## 2023-12-18 LAB
FLUAV + FLUBV RNA SPEC NAA+PROBE: NOT DETECTED
FLUAV + FLUBV RNA SPEC NAA+PROBE: NOT DETECTED
RSV RNA SPEC NAA+PROBE: NOT DETECTED
SARS-COV-2 RNA RESP QL NAA+PROBE: NOT DETECTED

## 2023-12-27 DIAGNOSIS — I10 ESSENTIAL HYPERTENSION: ICD-10-CM

## 2023-12-27 DIAGNOSIS — E11.9 TYPE 2 DIABETES MELLITUS WITHOUT COMPLICATION, WITHOUT LONG-TERM CURRENT USE OF INSULIN (HCC): ICD-10-CM

## 2023-12-27 RX ORDER — LISINOPRIL AND HYDROCHLOROTHIAZIDE 25; 20 MG/1; MG/1
1 TABLET ORAL DAILY
Qty: 90 TABLET | Refills: 1 | Status: SHIPPED | OUTPATIENT
Start: 2023-12-27

## 2023-12-27 NOTE — TELEPHONE ENCOUNTER
Requested Prescriptions     Pending Prescriptions Disp Refills    LISINOPRIL-HYDROCHLOROTHIAZIDE 20-25 MG Oral Tab [Pharmacy Med Name: LISINOPRIL-HCTZ 20/25MG TABLETS] 90 tablet 1     Sig: TAKE 1 TABLET BY MOUTH DAILY       LOV:12/12/23 AMS    LAST CPE:5/23/23 AMS    Last Labs:5/18/23 A1c, psa, cbc, lipid, cmp    Last Refill:  lisinopril-hydroCHLOROthiazide 20-25 MG Oral Tab 90 tablet 1 6/29/2023 --   Sig:   Take 1 tablet by mouth daily.      Route:   Oral     Order #:   [id number]         Your appointments       Date & Time Appointment Department [facility])    Jun 14, 2024  7:00 AM CDT Follow up - Extended with Amie Davila DO [address] [place],[address] ([facility] [place])              [place]  [facility] [employer] [facility] [place]  [address]

## 2024-02-26 NOTE — TELEPHONE ENCOUNTER
Requested Prescriptions     Pending Prescriptions Disp Refills    METFORMIN HCL 1000 MG Oral Tab [Pharmacy Med Name: METFORMIN 1000MG TABLETS] 180 tablet 1     Sig: TAKE 1 TABLET BY MOUTH TWICE DAILY WITH MEALS       LOV:12/12/23 AMS    LAST CPE:5/23/23 AMS    Last Labs:5/18/23 cmp,lipid,cbc,psa,A1c,micro    Last Refill:  Medication Quantity Refills Start End   metFORMIN HCl 1000 MG Oral Tab 180 tablet 1 7/31/2023 --   Sig:   TAKE 1 TABLET BY MOUTH TWICE DAILY WITH MEALS     Route:   (none)     Order #:   [id number]         Your appointments       Date & Time Appointment Department ([place])    Jun 14, 2024  7:00 AM CDT Follow up - Extended with Jocelyne Mott DO [facility], [address] [place] [address] ([facility] [place])              [facility], [address] [place] [address]  [facility] [place] [address] [address] [place] [id number]  [phone]

## 2024-05-30 DIAGNOSIS — E11.9 TYPE 2 DIABETES MELLITUS WITHOUT COMPLICATION, WITHOUT LONG-TERM CURRENT USE OF INSULIN (HCC): Primary | ICD-10-CM

## 2024-05-30 DIAGNOSIS — Z13.0 SCREENING FOR DEFICIENCY ANEMIA: ICD-10-CM

## 2024-05-30 DIAGNOSIS — E78.2 MIXED HYPERLIPIDEMIA: ICD-10-CM

## 2024-05-30 DIAGNOSIS — Z12.5 SCREENING PSA (PROSTATE SPECIFIC ANTIGEN): ICD-10-CM

## 2024-06-03 RX ORDER — ATORVASTATIN CALCIUM 10 MG/1
10 TABLET, FILM COATED ORAL NIGHTLY
Qty: 90 TABLET | Refills: 0 | Status: SHIPPED | OUTPATIENT
Start: 2024-06-03

## 2024-06-03 NOTE — TELEPHONE ENCOUNTER
Please review. Protocol Failed; No Protocol    Requested Prescriptions   Pending Prescriptions Disp Refills    ATORVASTATIN 10 MG Oral Tab [Pharmacy Med Name: ATORVASTATIN 10MG TABLETS] 90 tablet 1     Sig: TAKE 1 TABLET(10 MG) BY MOUTH EVERY NIGHT       Cholesterol Medication Protocol Failed - 5/30/2024  5:37 PM        Failed - ALT < 80     Lab Results   Component Value Date    ALT 20 05/18/2023             Failed - ALT resulted within past year        Failed - Lipid panel within past 12 months     Lab Results   Component Value Date    CHOLEST 115 05/18/2023    TRIG 60 05/18/2023    HDL 41 05/18/2023    LDL 61 05/18/2023    VLDL 9 05/18/2023    TCHDLRATIO 2.75 09/21/2022    NONHDLC 74 05/18/2023             Passed - In person appointment or virtual visit in the past 12 mos or appointment in next 3 mos     Recent Outpatient Visits              5 months ago Acute viral syndrome    [facility], [facility], [address] [place] [address] Maria Del Rosario Hampton PA-C    Office Visit    5 months ago Controlled type 2 diabetes mellitus without complication, without long-term current use of insulin ([facility])    [address]    Office Visit    8 months ago Sore throat    [facility], [facility], [address] [place], [place] [address]Lorri Mariee PA-C    Office Visit    1 year ago Annual physical exam    [address]    Office Visit    1 year ago Type 2 diabetes mellitus without complication, without long-term current use of insulin ([facility])    [address]    Office Visit          Future Appointments         Provider Department Appt Notes    In 1 week Jocelyne Mott DO [address] fup                           Future Appointments         Provider Department Appt Notes    In 1  Jocelyne Womack DO [facility], [address] [place] [address] DM fup          Recent Outpatient Visits              5 months ago Acute viral syndrome    [facility], [address] [place] [address] Maria Del Rosario Hampton PA-C    Office Visit    5 months ago Controlled type 2 diabetes mellitus without complication, without long-term current use of insulin ([facility])    [facility], [address] [place] [address] Jocelyne Mott DO    Office Visit    8 months ago Sore throat    [facility], [address] [place] [address] Lorri Pryor PA-C    Office Visit    1 year ago Annual physical exam    [facility], [address] [place], [place] [address]Jocelyne Barron DO    Office Visit    1 year ago Type 2 diabetes mellitus without complication, without long-term current use of insulin ([facility])    [facility], [address] [place] [address] Jocelyne Mott DO    Office Visit

## 2024-06-04 NOTE — TELEPHONE ENCOUNTER
Future Appointments   Date Time Provider Department Center   6/14/2024  7:00 AM Jocelyne Mott,  [facility]

## 2024-06-14 ENCOUNTER — LAB ENCOUNTER (OUTPATIENT)
Dept: LAB | Age: 54
End: 2024-06-14
Attending: FAMILY MEDICINE
Payer: COMMERCIAL

## 2024-06-14 ENCOUNTER — OFFICE VISIT (OUTPATIENT)
Dept: INTERNAL MEDICINE CLINIC | Facility: CLINIC | Age: 54
End: 2024-06-14
Payer: COMMERCIAL

## 2024-06-14 VITALS
OXYGEN SATURATION: 97 % | SYSTOLIC BLOOD PRESSURE: 118 MMHG | DIASTOLIC BLOOD PRESSURE: 80 MMHG | WEIGHT: 215.19 LBS | HEART RATE: 86 BPM | RESPIRATION RATE: 16 BRPM | BODY MASS INDEX: 30.46 KG/M2 | HEIGHT: 70.47 IN | TEMPERATURE: 98 F

## 2024-06-14 DIAGNOSIS — Z00.00 ANNUAL PHYSICAL EXAM: Primary | ICD-10-CM

## 2024-06-14 DIAGNOSIS — E78.2 MIXED HYPERLIPIDEMIA: ICD-10-CM

## 2024-06-14 DIAGNOSIS — Z12.5 SCREENING PSA (PROSTATE SPECIFIC ANTIGEN): ICD-10-CM

## 2024-06-14 DIAGNOSIS — M25.561 CHRONIC PAIN OF RIGHT KNEE: ICD-10-CM

## 2024-06-14 DIAGNOSIS — E11.9 TYPE 2 DIABETES MELLITUS WITHOUT COMPLICATION, WITHOUT LONG-TERM CURRENT USE OF INSULIN (HCC): ICD-10-CM

## 2024-06-14 DIAGNOSIS — I10 ESSENTIAL HYPERTENSION: ICD-10-CM

## 2024-06-14 DIAGNOSIS — G89.29 CHRONIC PAIN OF RIGHT KNEE: ICD-10-CM

## 2024-06-14 DIAGNOSIS — Z13.0 SCREENING FOR DEFICIENCY ANEMIA: ICD-10-CM

## 2024-06-14 LAB
ALBUMIN SERPL-MCNC: 3.8 G/DL (ref 3.4–5)
ALBUMIN/GLOB SERPL: 1.2 {RATIO} (ref 1–2)
ALP LIVER SERPL-CCNC: 46 U/L
ALT SERPL-CCNC: 20 U/L
ANION GAP SERPL CALC-SCNC: 9 MMOL/L (ref 0–18)
AST SERPL-CCNC: 16 U/L (ref 15–37)
BASOPHILS # BLD AUTO: 0.08 X10(3) UL (ref 0–0.2)
BASOPHILS NFR BLD AUTO: 1.3 %
BILIRUB SERPL-MCNC: 0.6 MG/DL (ref 0.1–2)
BUN BLD-MCNC: 29 MG/DL (ref 9–23)
CALCIUM BLD-MCNC: 8.6 MG/DL (ref 8.5–10.1)
CHLORIDE SERPL-SCNC: 108 MMOL/L (ref 98–112)
CHOLEST SERPL-MCNC: 111 MG/DL (ref ?–200)
CO2 SERPL-SCNC: 23 MMOL/L (ref 21–32)
COMPLEXED PSA SERPL-MCNC: 0.93 NG/ML (ref ?–4)
CREAT BLD-MCNC: 1.29 MG/DL
CREAT UR-SCNC: 356 MG/DL
EGFRCR SERPLBLD CKD-EPI 2021: 65 ML/MIN/1.73M2 (ref 60–?)
EOSINOPHIL # BLD AUTO: 0.24 X10(3) UL (ref 0–0.7)
EOSINOPHIL NFR BLD AUTO: 4 %
ERYTHROCYTE [DISTWIDTH] IN BLOOD BY AUTOMATED COUNT: 12.4 %
FASTING PATIENT LIPID ANSWER: YES
FASTING STATUS PATIENT QL REPORTED: YES
GLOBULIN PLAS-MCNC: 3.1 G/DL (ref 2.8–4.4)
GLUCOSE BLD-MCNC: 121 MG/DL (ref 70–99)
HCT VFR BLD AUTO: 41.3 %
HDLC SERPL-MCNC: 48 MG/DL (ref 40–59)
HEMOGLOBIN A1C: 6.1 % (ref 4.3–5.6)
HGB BLD-MCNC: 14.5 G/DL
IMM GRANULOCYTES # BLD AUTO: 0.01 X10(3) UL (ref 0–1)
IMM GRANULOCYTES NFR BLD: 0.2 %
LDLC SERPL CALC-MCNC: 48 MG/DL (ref ?–100)
LYMPHOCYTES # BLD AUTO: 1.88 X10(3) UL (ref 1–4)
LYMPHOCYTES NFR BLD AUTO: 31.3 %
MCH RBC QN AUTO: 31.8 PG (ref 26–34)
MCHC RBC AUTO-ENTMCNC: 35.1 G/DL (ref 31–37)
MCV RBC AUTO: 90.6 FL
MICROALBUMIN UR-MCNC: 2.98 MG/DL
MICROALBUMIN/CREAT 24H UR-RTO: 8.4 UG/MG (ref ?–30)
MONOCYTES # BLD AUTO: 0.58 X10(3) UL (ref 0.1–1)
MONOCYTES NFR BLD AUTO: 9.7 %
NEUTROPHILS # BLD AUTO: 3.21 X10 (3) UL (ref 1.5–7.7)
NEUTROPHILS # BLD AUTO: 3.21 X10(3) UL (ref 1.5–7.7)
NEUTROPHILS NFR BLD AUTO: 53.5 %
NONHDLC SERPL-MCNC: 63 MG/DL (ref ?–130)
OSMOLALITY SERPL CALC.SUM OF ELEC: 297 MOSM/KG (ref 275–295)
PLATELET # BLD AUTO: 252 10(3)UL (ref 150–450)
POTASSIUM SERPL-SCNC: 4 MMOL/L (ref 3.5–5.1)
PROT SERPL-MCNC: 6.9 G/DL (ref 6.4–8.2)
RBC # BLD AUTO: 4.56 X10(6)UL
SODIUM SERPL-SCNC: 140 MMOL/L (ref 136–145)
TRIGL SERPL-MCNC: 71 MG/DL (ref 30–149)
VLDLC SERPL CALC-MCNC: 10 MG/DL (ref 0–30)
WBC # BLD AUTO: 6 X10(3) UL (ref 4–11)

## 2024-06-14 PROCEDURE — 80053 COMPREHEN METABOLIC PANEL: CPT | Performed by: FAMILY MEDICINE

## 2024-06-14 PROCEDURE — 3044F HG A1C LEVEL LT 7.0%: CPT | Performed by: FAMILY MEDICINE

## 2024-06-14 PROCEDURE — 84153 ASSAY OF PSA TOTAL: CPT | Performed by: FAMILY MEDICINE

## 2024-06-14 PROCEDURE — 82570 ASSAY OF URINE CREATININE: CPT | Performed by: FAMILY MEDICINE

## 2024-06-14 PROCEDURE — 90677 PCV20 VACCINE IM: CPT | Performed by: FAMILY MEDICINE

## 2024-06-14 PROCEDURE — 99214 OFFICE O/P EST MOD 30 MIN: CPT | Performed by: FAMILY MEDICINE

## 2024-06-14 PROCEDURE — 3008F BODY MASS INDEX DOCD: CPT | Performed by: FAMILY MEDICINE

## 2024-06-14 PROCEDURE — 82043 UR ALBUMIN QUANTITATIVE: CPT | Performed by: FAMILY MEDICINE

## 2024-06-14 PROCEDURE — 83036 HEMOGLOBIN GLYCOSYLATED A1C: CPT | Performed by: FAMILY MEDICINE

## 2024-06-14 PROCEDURE — 3074F SYST BP LT 130 MM HG: CPT | Performed by: FAMILY MEDICINE

## 2024-06-14 PROCEDURE — 99396 PREV VISIT EST AGE 40-64: CPT | Performed by: FAMILY MEDICINE

## 2024-06-14 PROCEDURE — 3079F DIAST BP 80-89 MM HG: CPT | Performed by: FAMILY MEDICINE

## 2024-06-14 PROCEDURE — 80061 LIPID PANEL: CPT | Performed by: FAMILY MEDICINE

## 2024-06-14 PROCEDURE — 90471 IMMUNIZATION ADMIN: CPT | Performed by: FAMILY MEDICINE

## 2024-06-14 PROCEDURE — 85025 COMPLETE CBC W/AUTO DIFF WBC: CPT | Performed by: FAMILY MEDICINE

## 2024-06-14 RX ORDER — METFORMIN HYDROCHLORIDE 500 MG/1
500 TABLET, EXTENDED RELEASE ORAL DAILY
Qty: 90 TABLET | Refills: 1 | Status: SHIPPED | OUTPATIENT
Start: 2024-06-14

## 2024-06-14 NOTE — PROGRESS NOTES
Subjective:   Patient ID: Nimesh Mike is a 54 year old male.    HPI Here for annual check-up. Is physically active. Taking meds as prescribed. Has had a couple of recent episodes where he felt weak and very hungry suddenly. Ate something and then felt better.   Has had left knee replaced and right knee has been bothering him for years, getting worse.     History/Other:   Past Medical History:    Acute conjunctivitis    Allergic rhinitis    CAD (coronary artery disease)    Cellulitis    Dehydration    Diabetes (HCC)    Diabetes mellitus, type II (HCC)    Diarrhea    Dysfunctions associated with sleep stages or arousal from sleep    Effusion of lower leg joint    Hiatal hernia    High blood pressure    Hyperglycemia    Hyperlipidemia    Internal derangement of knee joint    Left Knee     Mitral valve prolapse    Obesity    Osteoarthrosis, unspecified whether generalized or localized, lower leg    L leg    Sensorineural hearing loss    sensory hearing loss unilaterally, Rt ear    Skin tag    Snoring    Unspecified essential hypertension    Visual impairment    glasses    Vomiting     Past Surgical History:   Procedure Laterality Date    Hernia surgery      hernia repair    Knee replacement surgery  2018 complete    Knee surgery      Other      right ankle soft tissues surgery - [facility]    Other surgical history      ORIF left arm and nose - no metal     Repair ing hernia,4+y/o,reducibl      Vasectomy  3/1/2015     Social History     Socioeconomic History    Marital status:    Tobacco Use    Smoking status: Never    Smokeless tobacco: Never   Vaping Use    Vaping status: Never Used   Substance and Sexual Activity    Alcohol use: Yes     Alcohol/week: 1.0 standard drink of alcohol     Types: 1 Cans of beer per week     Comment: once a week casual    Drug use: No    Sexual activity: Yes     Partners: Female   Other Topics Concern    Caffeine Concern Yes    Exercise Yes    Seat Belt Yes    Special Diet No     Stress Concern Yes     Comment: Always  worrying    Weight Concern Yes     Comment: Constantly     Family History   Problem Relation Age of Onset    Cancer Neg     Heart Disease Neg     Stroke Neg        Review of Systems   Constitutional:  Negative for chills, fatigue and fever.   HENT:  Negative for hearing loss and sore throat.    Eyes:  Negative for pain and visual disturbance.   Respiratory:  Negative for choking, shortness of breath and wheezing.    Cardiovascular:  Negative for chest pain, palpitations and leg swelling.   Gastrointestinal:  Negative for abdominal pain, constipation, diarrhea, nausea and vomiting.   Endocrine: Negative for polydipsia, polyphagia and polyuria.   Genitourinary:  Negative for dysuria.   Musculoskeletal:  Negative for arthralgias and joint swelling.   Skin:  Negative for rash.   Neurological:  Negative for dizziness, weakness, light-headedness, numbness and headaches.   Hematological:  Negative for adenopathy. Does not bruise/bleed easily.   Psychiatric/Behavioral:  Negative for dysphoric mood. The patient is not nervous/anxious.      Current Outpatient Medications   Medication Sig Dispense Refill    metFORMIN  MG Oral Tablet 24 Hr Take 1 tablet (500 mg total) by mouth daily. 90 tablet 1    atorvastatin 10 MG Oral Tab Take 1 tablet (10 mg total) by mouth nightly. 90 tablet 0    lisinopril-hydroCHLOROthiazide 20-25 MG Oral Tab Take 1 tablet by mouth daily. 90 tablet 1    albuterol 108 (90 Base) MCG/ACT Inhalation Aero Soln Inhale 2 puffs into the lungs every 6 (six) hours as needed for Wheezing. 1 each 0    benzonatate 200 MG Oral Cap Take 1 capsule (200 mg total) by mouth 3 (three) times daily as needed. 30 capsule 0    semaglutide (OZEMPIC, 1 MG/DOSE,) 4 MG/3ML Subcutaneous Solution Pen-injector Inject 1 mg into the skin once a week. 9 mL 1    Insulin Pen Needle (PEN NEEDLES) 32G X 4 MM Does not apply Misc 1 each by Does not apply route every 7 days. 30 each 0      Allergies:No Known Allergies    Objective:   Physical Exam  Vitals reviewed.   Constitutional:       Appearance: Normal appearance. He is well-developed.   HENT:      Head: Normocephalic and atraumatic.      Right Ear: Tympanic membrane, ear canal and external ear normal.      Left Ear: Tympanic membrane, ear canal and external ear normal.      Mouth/Throat:      Pharynx: No posterior oropharyngeal erythema.   Eyes:      Conjunctiva/sclera: Conjunctivae normal.   Cardiovascular:      Rate and Rhythm: Normal rate and regular rhythm.      Heart sounds: Normal heart sounds.   Pulmonary:      Effort: Pulmonary effort is normal.      Breath sounds: Normal breath sounds.   Musculoskeletal:      Cervical back: Normal range of motion and neck supple.   Skin:     General: Skin is warm and dry.   Neurological:      Mental Status: He is alert and oriented to person, place, and time.   Psychiatric:         Behavior: Behavior normal.         Assessment & Plan:   1. Annual physical exam    2. Type 2 diabetes mellitus without complication, without long-term current use of insulin (HCC)    3. Chronic pain of right knee    4. Mixed hyperlipidemia    5. Essential hypertension    Reviewed age-appropriate preventive health and safety recommendations with patient. Check labs. Encouraged regular exercise and healthy eating.   A1c at goal with some possible low blood sugars. Reduce Metformin to 500mg daily. Recheck A1c in 6 months. PCV 20.  Ortho referral.   Check lipids. Continue statin.   BP controlled on med. Continue.     Orders Placed This Encounter   Procedures    Microalb/Creat Ratio, Random Urine [E]    POC Hgb A1C    Prevnar 20 (PCV20) [47665]       Meds This Visit:  Requested Prescriptions     Signed Prescriptions Disp Refills    metFORMIN  MG Oral Tablet 24 Hr 90 tablet 1     Sig: Take 1 tablet (500 mg total) by mouth daily.       Imaging & Referrals:  ORTHOPEDIC - INTERNAL  PCV20 VACCINE FOR INTRAMUSCULAR USE

## 2024-06-24 DIAGNOSIS — I10 ESSENTIAL HYPERTENSION: ICD-10-CM

## 2024-06-24 DIAGNOSIS — E11.9 TYPE 2 DIABETES MELLITUS WITHOUT COMPLICATION, WITHOUT LONG-TERM CURRENT USE OF INSULIN (HCC): ICD-10-CM

## 2024-06-26 RX ORDER — LISINOPRIL AND HYDROCHLOROTHIAZIDE 25; 20 MG/1; MG/1
1 TABLET ORAL DAILY
Qty: 90 TABLET | Refills: 3 | Status: SHIPPED | OUTPATIENT
Start: 2024-06-26

## 2024-06-26 NOTE — TELEPHONE ENCOUNTER
Refill passed per [facility] protocol.  Requested Prescriptions   Pending Prescriptions Disp Refills    LISINOPRIL-HYDROCHLOROTHIAZIDE 20-25 MG Oral Tab [Pharmacy Med Name: LISINOPRIL-HCTZ 20/25MG TABLETS] 90 tablet 1     Sig: Take 1 tablet by mouth daily.       Hypertension Medications Protocol Passed - 6/24/2024  5:56 AM        Passed - CMP or BMP in past 12 months        Passed - Last BP reading less than 140/90     BP Readings from Last 1 Encounters:   06/14/24 118/80               Passed - In person appointment or virtual visit in the past 12 mos or appointment in next 3 mos     Recent Outpatient Visits              1 week ago Annual physical exam    [facility], [address] [place] [address] Jocelyne Mott DO    Office Visit    6 months ago Acute viral syndrome    [facility], [facility], [address] [place] [address] Maria Del Rosario Hampton PA-C    Office Visit    6 months ago Controlled type 2 diabetes mellitus without complication, without long-term current use of insulin ([facility])    [address] Jocelyne Mott DO    Office Visit    8 months ago Sore throat    [facility], [facility], [address] [place] [address] Lorri Pryor PA-C    Office Visit    1 year ago Annual physical exam    [address] Jocelyne Mott DO    Office Visit          Future Appointments         Provider Department Appt Notes    In 5 months Jocelyne Mott DO [address] This is A1C 6 month visit. ok NEVAEH                    Passed - EGFRCR or GFRNAA > 50     GFR Evaluation  EGFRCR: 65 , resulted on 6/14/2024             Recent Outpatient Visits              1 week ago Annual physical exam    [address]    Office Visit    6 months ago Acute viral syndrome    [facility], [facility], [address] [place] [address] Maria Del Rosario Hampton PA-C    Office Visit    6 months ago Controlled type 2 diabetes mellitus without complication, without long-term current use of insulin ([facility])    [facility], [address] [place] [address] Jocelyne Mott DO    Office Visit    8 months ago Sore throat    [facility], [address] [place] [address] Lorri Pryor PA-C    Office Visit    1 year ago Annual physical exam    [address] Jocelyne Mott DO    Office Visit          Future Appointments         Provider Department Appt Notes    In 5 months Jocelyne Mott DO [facility], [address] [place] [address] This is A1C 6 month visit. ok JA

## 2024-06-27 DIAGNOSIS — E11.9 CONTROLLED TYPE 2 DIABETES MELLITUS WITHOUT COMPLICATION, WITHOUT LONG-TERM CURRENT USE OF INSULIN (HCC): ICD-10-CM

## 2024-06-28 RX ORDER — SEMAGLUTIDE 1.34 MG/ML
1 INJECTION, SOLUTION SUBCUTANEOUS WEEKLY
Qty: 9 ML | Refills: 1 | Status: SHIPPED | OUTPATIENT
Start: 2024-06-28

## 2024-06-28 NOTE — TELEPHONE ENCOUNTER
Nimesh Mike requesting Medication Refill for:    Medication name and dose (copy and paste from medication list):   Medication Quantity Refills Start End   semaglutide (OZEMPIC, 1 MG/DOSE,) 4 MG/3ML Subcutaneous Solution Pen-injector 9 mL 1 12/12/2023 --   Sig:   Inject 1 mg into the skin once a week.     Route:   Subcutaneous     Order #:   [id number]         If medication is not on medication list - transfer patient to RN queue for triage    Preferred Pharmacy:   [id number] [address] [facility] [address], [phone], [phone]   [facility] [address] [facility] [address]   Phone: [phone] Fax: [phone]   Hours: Not open 24 hours       LOV: 6/14/2024   Last Refill date: 12/12/23  Next Scheduled appointment: 12/20/2024

## 2024-06-28 NOTE — TELEPHONE ENCOUNTER
Refill passed per [facility] protocols.    Requested Prescriptions   Pending Prescriptions Disp Refills    OZEMPIC, 1 MG/DOSE, 4 MG/3ML Subcutaneous Solution Pen-injector [Pharmacy Med Name: OZEMPIC 1MG PER DOSE (4MG/3ML) PFP] 9 mL 1     Sig: INJECT 1 MG UNDER THE SKIN ONE DAY A WEEK       Diabetes Medication Protocol Passed - 6/28/2024  9:16 AM        Passed - Last A1C < 7.5 and within past 6 months     Lab Results   Component Value Date    A1C 6.1 (A) 06/14/2024             Passed - In person appointment or virtual visit in the past 6 mos or appointment in next 3 mos     Recent Outpatient Visits              2 weeks ago Annual physical exam    [facility], [address] [place], [place] [address]Jocelyne Barron DO    Office Visit    6 months ago Acute viral syndrome    [facility], Walk-In Clinic, [address] [place] [address] Maria Del Rosario Hampton PA-C    Office Visit    6 months ago Controlled type 2 diabetes mellitus without complication, without long-term current use of insulin ([facility])    [address]    Office Visit    9 months ago Sore throat    [facility], Walk-In [facility], [address] [place] [address] Lorri Pryor PA-C    Office Visit    1 year ago Annual physical exam    [address]    Office Visit          Future Appointments         Provider Department Appt Notes    In 5 months Jocelyne Mott DO [address] This is A1C 6 month visit. ok NEVAEH                    Passed - Microalbumin procedure in past 12 months or taking ACE/ARB        Passed - EGFRCR or GFRNAA > 50     GFR Evaluation  EGFRCR: 65 , resulted on 6/14/2024          Passed - GFR in the past 12 months

## 2024-08-20 RX ORDER — ATORVASTATIN CALCIUM 10 MG/1
10 TABLET, FILM COATED ORAL NIGHTLY
Qty: 90 TABLET | Refills: 3 | Status: SHIPPED | OUTPATIENT
Start: 2024-08-20

## 2024-08-20 NOTE — TELEPHONE ENCOUNTER
Refill Passed per Protocol.     Requested Prescriptions   Pending Prescriptions Disp Refills    ATORVASTATIN 10 MG Oral Tab [Pharmacy Med Name: ATORVASTATIN 10MG TABLETS] 90 tablet 0     Sig: TAKE 1 TABLET(10 MG) BY MOUTH EVERY NIGHT       Cholesterol Medication Protocol Passed - 8/16/2024  4:54 AM        Passed - ALT < 80     Lab Results   Component Value Date    ALT 20 06/14/2024             Passed - ALT resulted within past year        Passed - Lipid panel within past 12 months     Lab Results   Component Value Date    CHOLEST 111 06/14/2024    TRIG 71 06/14/2024    HDL 48 06/14/2024    LDL 48 06/14/2024    VLDL 10 06/14/2024    TCHDLRATIO 2.75 09/21/2022    NONHDLC 63 06/14/2024             Passed - In person appointment or virtual visit in the past 12 mos or appointment in next 3 mos     Recent Outpatient Visits              2 months ago Annual physical exam    [address] Jocelyne Mott DO    Office Visit    8 months ago Acute viral syndrome    [facility], Walk-In [facility], [address] [place] [address] Maria Del Rosario Hampton PA-C    Office Visit    8 months ago Controlled type 2 diabetes mellitus without complication, without long-term current use of insulin ([facility])    [address] Jocelyne Mott DO    Office Visit    10 months ago Sore throat    [facility], Walk-In [facility], [address] [place] [address] Lorri Pryor PA-C    Office Visit    1 year ago Annual physical exam    [address] Jocelyne Mott DO    Office Visit          Future Appointments         Provider Department Appt Notes    In 4 months Jocelyne Mott DO [address] This is A1C 6 month visit. ok JA                          Future Appointments         Provider Department Appt Notes    In 4 months Jocelyne Mott DO [facility],  [address] [place] [address] This is A1C 6 month visit. ok JA          Recent Outpatient Visits              2 months ago Annual physical exam    [facility], [address] [place], [place] [address]Jocelyne Barron DO    Office Visit    8 months ago Acute viral syndrome    [facility], Walk-In Clinic, [address] [place] [address] Maria Del Rosario Hampton PA-C    Office Visit    8 months ago Controlled type 2 diabetes mellitus without complication, without long-term current use of insulin (HCC)    [facility], [address] [place], [place] [address]Jocelyne Mota DO    Office Visit    10 months ago Sore throat    [facility], [facility]In Clinic, [address] [place] [address] Lorri Pryor PA-C    Office Visit    1 year ago Annual physical exam    [facility], [address] [place], [place] [address]Jocelyne Barron DO    Office Visit

## 2024-10-12 DIAGNOSIS — E11.9 CONTROLLED TYPE 2 DIABETES MELLITUS WITHOUT COMPLICATION, WITHOUT LONG-TERM CURRENT USE OF INSULIN (HCC): ICD-10-CM

## 2024-10-15 RX ORDER — SEMAGLUTIDE 1.34 MG/ML
1 INJECTION, SOLUTION SUBCUTANEOUS WEEKLY
Qty: 9 ML | Refills: 1 | Status: SHIPPED | OUTPATIENT
Start: 2024-10-15

## 2024-10-15 NOTE — TELEPHONE ENCOUNTER
REFILL PASSED PER [facility] PROTOCOLS    Requested Prescriptions   Pending Prescriptions Disp Refills    OZEMPIC, 1 MG/DOSE, 4 MG/3ML Subcutaneous Solution Pen-injector [Pharmacy Med Name: OZEMPIC 1MG PER DOSE (4MG/3ML) PFP] 9 mL 1     Sig: Inject 1 mg into the skin once a week.       Diabetes Medication Protocol Passed - 10/15/2024  9:50 AM        Passed - Last A1C < 7.5 and within past 6 months     Lab Results   Component Value Date    A1C 6.1 (A) 06/14/2024             Passed - In person appointment or virtual visit in the past 6 mos or appointment in next 3 mos     Recent Outpatient Visits              4 months ago Annual physical exam    [address] Jocelyne Mott DO    Office Visit    10 months ago Acute viral syndrome    [facility], Walk-In [facility], [address] [place] [address] Maria Del Rosario Hampton PA-C    Office Visit    10 months ago Controlled type 2 diabetes mellitus without complication, without long-term current use of insulin (HCC)    [address] Jocelyne Mott DO    Office Visit    1 year ago Sore throat    [facility], Walk-In [facility], [address] [place] [address] Lorri Pryor PA-C    Office Visit    1 year ago Annual physical exam    [address] Jocelyne Mott DO    Office Visit          Future Appointments         Provider Department Appt Notes    In 2 months Jocelyne Mott DO [address] This is A1C 6 month visit. ok JA                    Passed - Microalbumin procedure in past 12 months or taking ACE/ARB        Passed - EGFRCR or GFRNAA > 50     GFR Evaluation  EGFRCR: 65 , resulted on 6/14/2024          Passed - GFR in the past 12 months             Future Appointments         Provider Department Appt Notes    In 2 months Jocelyne Mott DO [address]  [place] This is A1C 6 month visit. ok JA          Recent Outpatient Visits              4 months ago Annual physical exam    [facility], [address] [place] [address], GaryJocelyne Mota DO    Office Visit    10 months ago Acute viral syndrome    [facility], Walk-In Clinic, [address] [place], [place] [address]Maria Del Rosario Vasquez PA-C    Office Visit    10 months ago Controlled type 2 diabetes mellitus without complication, without long-term current use of insulin (HCC)    [facility], [address] [place] [address], GaryJocelyne Mota DO    Office Visit    1 year ago Sore throat    [facility], Walk[facility]In Clinic, [address] [place], [place] [address]Lorri Mariee PA-C    Office Visit    1 year ago Annual physical exam    [facility], [address] [place] [address], GaryJocelyne Mota DO    Office Visit

## 2024-11-19 ENCOUNTER — HOSPITAL ENCOUNTER (OUTPATIENT)
Age: 54
Discharge: HOME | End: 2024-11-19
Attending: EMERGENCY MEDICINE
Payer: COMMERCIAL

## 2024-11-19 ENCOUNTER — NURSE TRIAGE (OUTPATIENT)
Dept: INTERNAL MEDICINE CLINIC | Facility: CLINIC | Age: 54
End: 2024-11-19

## 2024-11-19 VITALS
BODY MASS INDEX: 30.78 KG/M2 | HEART RATE: 71 BPM | DIASTOLIC BLOOD PRESSURE: 73 MMHG | TEMPERATURE: 97 F | WEIGHT: 215 LBS | SYSTOLIC BLOOD PRESSURE: 112 MMHG | RESPIRATION RATE: 16 BRPM | HEIGHT: 70 IN | OXYGEN SATURATION: 99 %

## 2024-11-19 DIAGNOSIS — N28.9 RENAL INSUFFICIENCY: ICD-10-CM

## 2024-11-19 DIAGNOSIS — E86.0 DEHYDRATION: ICD-10-CM

## 2024-11-19 DIAGNOSIS — K52.9 GASTROENTERITIS: Primary | ICD-10-CM

## 2024-11-19 LAB
#MXD IC: 1.2 X10ˆ3/UL (ref 0.1–1)
BILIRUB UR QL STRIP: NEGATIVE
BUN BLD-MCNC: 37 MG/DL (ref 7–18)
CHLORIDE BLD-SCNC: 100 MMOL/L (ref 98–112)
CO2 BLD-SCNC: 21 MMOL/L (ref 21–32)
CREAT BLD-MCNC: 1.7 MG/DL
EGFRCR SERPLBLD CKD-EPI 2021: 47 ML/MIN/1.73M2 (ref 60–?)
GLUCOSE BLD-MCNC: 160 MG/DL (ref 70–99)
GLUCOSE UR STRIP-MCNC: 100 MG/DL
HCT VFR BLD AUTO: 44.8 %
HCT VFR BLD CALC: 45 %
HGB BLD-MCNC: 15.7 G/DL
HGB UR QL STRIP: NEGATIVE
ISTAT IONIZED CALCIUM FOR CHEM 8: 1.08 MMOL/L (ref 1.12–1.32)
KETONES UR STRIP-MCNC: NEGATIVE MG/DL
LEUKOCYTE ESTERASE UR QL STRIP: NEGATIVE
LYMPHOCYTES # BLD AUTO: 1.4 X10ˆ3/UL (ref 1–4)
LYMPHOCYTES NFR BLD AUTO: 16.5 %
MCH RBC QN AUTO: 31 PG (ref 26–34)
MCHC RBC AUTO-ENTMCNC: 35 G/DL (ref 31–37)
MCV RBC AUTO: 88.5 FL (ref 80–100)
MIXED CELL %: 14 %
NEUTROPHILS # BLD AUTO: 5.8 X10ˆ3/UL (ref 1.5–7.7)
NEUTROPHILS NFR BLD AUTO: 69.5 %
NITRITE UR QL STRIP: NEGATIVE
PH UR STRIP: 6 [PH]
PLATELET # BLD AUTO: 288 X10ˆ3/UL (ref 150–450)
POTASSIUM BLD-SCNC: 3.2 MMOL/L (ref 3.6–5.1)
RBC # BLD AUTO: 5.06 X10ˆ6/UL
SODIUM BLD-SCNC: 136 MMOL/L (ref 136–145)
SP GR UR STRIP: 1.02
UROBILINOGEN UR STRIP-ACNC: <2 MG/DL
WBC # BLD AUTO: 8.4 X10ˆ3/UL (ref 4–11)

## 2024-11-19 PROCEDURE — 85025 COMPLETE CBC W/AUTO DIFF WBC: CPT | Performed by: EMERGENCY MEDICINE

## 2024-11-19 PROCEDURE — 99204 OFFICE O/P NEW MOD 45 MIN: CPT

## 2024-11-19 PROCEDURE — 80047 BASIC METABLC PNL IONIZED CA: CPT

## 2024-11-19 PROCEDURE — 99214 OFFICE O/P EST MOD 30 MIN: CPT

## 2024-11-19 PROCEDURE — 81002 URINALYSIS NONAUTO W/O SCOPE: CPT

## 2024-11-19 PROCEDURE — 96360 HYDRATION IV INFUSION INIT: CPT

## 2024-11-19 RX ORDER — ONDANSETRON 4 MG/1
4 TABLET, ORALLY DISINTEGRATING ORAL EVERY 4 HOURS PRN
Qty: 10 TABLET | Refills: 0 | Status: SHIPPED | OUTPATIENT
Start: 2024-11-19 | End: 2024-11-26

## 2024-11-19 RX ORDER — SODIUM CHLORIDE 9 MG/ML
1000 INJECTION, SOLUTION INTRAVENOUS ONCE
Status: COMPLETED | OUTPATIENT
Start: 2024-11-19 | End: 2024-11-19

## 2024-11-19 NOTE — DISCHARGE INSTRUCTIONS
Follow-up with your primary care doctor  Drink plenty of fluids for hydration  Take Zofran every 4 hours as needed for nausea  Start with clear liquids, advance diet as tolerated  Take Imodium over-the-counter for diarrhea  Return if any worsening symptoms or new concern

## 2024-11-19 NOTE — TELEPHONE ENCOUNTER
Action Requested: Summary for Provider     []  Critical Lab, Recommendations Needed  [] Need Additional Advice  [x]   TREVON    []   Need Orders  [] Need Medications Sent to Pharmacy  []  Other     SUMMARY: Received call from pt. Per pt, he has not been able to keep any food/liquids down since Saturday. He has tried drinking small sips of water/gatorade, but comes up 5 mins later. Patient has also had diarrhea. Denies fever, mild abd pain/cramping prior to vomiting. Advised pt to be seen in UC for labs to check electrolytes/IVF's. Patient said he feels tired and is unsure if he will be able to go. Explained to pt he is likely very dehydrated and IVF's will likely help him feel better. Patient said he will try getting to  in [place].     FYI Dr. Mott     Reason for call: Nausea/Vomiting/Diarrhea  Onset: Saturday         Reason for Disposition   SEVERE vomiting (e.g., 6 or more times/day)  (Exception: Patient sounds well, is drinking liquids, does not sound dehydrated, and vomiting has lasted less than 24 hours.)   Drinking very little and has signs of dehydration (e.g., no urine > 12 hours, very dry mouth, very lightheaded)    Protocols used: Vomiting-A-OH

## 2024-11-19 NOTE — ED PROVIDER NOTES
Patient Seen in: [facility]      History     Chief Complaint   Patient presents with    Nausea/Vomiting/Diarrhea     Stated Complaint: Throwing up    Subjective:   HPI    54-year-old male presents to the immediate care for complaints of persistent nausea vomiting diarrhea for the last several days.  Also complains of generalized fatigue.  Denies fevers or chills.  Denies abdominal pain.  Denies melena or bloody stools.  He denies any recent ill contact.  Denies any other exacerbating leaving factors.    Objective:     Past Medical History:    Acute conjunctivitis    Allergic rhinitis    CAD (coronary artery disease)    Cellulitis    Dehydration    Diabetes (HCC)    Diabetes mellitus, type II (HCC)    Diarrhea    Dysfunctions associated with sleep stages or arousal from sleep    Effusion of lower leg joint    Hiatal hernia    High blood pressure    Hyperglycemia    Hyperlipidemia    Internal derangement of knee joint    Left Knee     Mitral valve prolapse    Obesity    Osteoarthrosis, unspecified whether generalized or localized, lower leg    L leg    Sensorineural hearing loss    sensory hearing loss unilaterally, Rt ear    Skin tag    Snoring    Unspecified essential hypertension    Visual impairment    glasses    Vomiting              Past Surgical History:   Procedure Laterality Date    Hernia surgery      hernia repair    Knee replacement surgery  2018 complete    Knee surgery      Other      right ankle soft tissues surgery - [facility]    Other surgical history      ORIF left arm and nose - no metal     Repair ing hernia,4+y/o,reducibl      Vasectomy  3/1/2015                Social History     Socioeconomic History    Marital status:    Tobacco Use    Smoking status: Never    Smokeless tobacco: Never   Vaping Use    Vaping status: Never Used   Substance and Sexual Activity    Alcohol use: Yes     Alcohol/week: 1.0 standard drink of alcohol     Types: 1 Cans of beer per week     Comment: once  a week casual    Drug use: No    Sexual activity: Yes     Partners: Female   Other Topics Concern    Caffeine Concern Yes    Exercise Yes    Seat Belt Yes    Special Diet No    Stress Concern Yes     Comment: Always  worrying    Weight Concern Yes     Comment: Constantly              Review of Systems    Positive for stated complaint: Throwing up  Other systems are as noted in HPI.  Constitutional and vital signs reviewed.      All other systems reviewed and negative except as noted above.    Physical Exam     ED Triage Vitals [11/19/24 1024]   BP 98/74   Pulse 94   Resp 16   Temp 97.1 °F (36.2 °C)   Temp src Temporal   SpO2 97 %   O2 Device None (Room air)       Current Vitals:   Vital Signs  BP: 112/73  Pulse: 71  Resp: 16  Temp: 97.1 °F (36.2 °C)  Temp src: Temporal    Oxygen Therapy  SpO2: 99 %  O2 Device: None (Room air)        Physical Exam  General: Alert and oriented. No acute distress.  HEENT: Normocephalic. No evidence of trauma. Extraocular movements are intact.  Cardiovascular exam: Regular rate and rhythm  Lungs: Clear to auscultation bilaterally.  Abdomen: Soft, nondistended, nontender.  Extremities: No evidence of deformity. No clubbing or cyanosis.  Neuro: No focal deficit is noted.    ED Course     Labs Reviewed   POCT CBC - Abnormal; Notable for the following components:       Result Value    # Mixed Cells 1.2 (*)     All other components within normal limits   [facility] POCT URINALYSIS DIPSTICK - Abnormal; Notable for the following components:    Urine Clarity Slightly cloudy (*)     Protein urine Trace (*)     Glucose, Urine 100 (*)     All other components within normal limits   POCT ISTAT CHEM8 CARTRIDGE - Abnormal; Notable for the following components:    ISTAT BUN 37 (*)     ISTAT Potassium 3.2 (*)     ISTAT Ionized Calcium 1.08 (*)     ISTAT Glucose 160 (*)     ISTAT Creatinine 1.70 (*)     eGFR-Cr 47 (*)     All other components within normal limits     Patient was brought back to the examination  room immediately.  The patient was then placed on a cardiac monitor and pulse oximetry was applied.  Patient had an IV established and labs were drawn.    The patient was administered normal saline bolus medications for the purposes of treating  [nausea/vomiting/dehydration].  The patient had good response to these medications.    Patient continued to be observed here in the emergency department.  Patient remained stable throughout the emergency department observation period.  On repeat assessment patient feels improved.  Review of his laboratory workup shows a BUN of 37 and creatinine of 1.7.  This is concerning for prerenal azotemia which would be consistent with his history of persistent vomiting diarrhea.  Patient was given a second liter normal saline IV fluid bolus.    Findings  of the patient's tests results were discussed with the patient in detail.  Patient will be discharged home with dietary instructions for gastroenteritis.  He will be given a prescription for Zofran.  Recommend Imodium over-the-counter for diarrhea.  Recommend follow-up with his primary care doctor.  They were understanding of these results and their discharge instructions.  All questions were answered.     MDM   Patient was screened and evaluated during this visit.   As a treating physician attending to the patient, I determined, within reasonable clinical confidence and prior to discharge, that an emergency medical condition was not or was no longer present.  There was no indication for further evaluation, treatment or admission on an emergency basis.  Comprehensive verbal and written discharge and follow-up instructions were provided to help prevent relapse or worsening.  Patient was instructed to follow-up with her primary care provider for further evaluation and treatment, but to return immediately to the ER for worsening, concerning, new, changing or persisting symptoms.  I discussed the case with the patient and they had no  questions, complaints, or concerns.  Patient felt comfortable going home.    ^^Please note that this report has been produced using speech recognition software and may contain errors related to that system including, but not limited to, errors in grammar, punctuation, and spelling, as well as words and phrases that possibly may have been recognized inappropriately.  If there are any questions or concerns, contact the dictating provider for clarification    Medical Decision Making      Disposition and Plan     Clinical Impression:  1. Gastroenteritis    2. Dehydration    3. Renal insufficiency         Disposition:  Discharge  11/19/2024 12:03 pm    Follow-up:  Jocelyne Mott DO  [address] [place] [address], [place] [address]  [phone]    Call   As needed, If symptoms worsen          Medications Prescribed:  Current Discharge Medication List        START taking these medications    Details   ondansetron 4 MG Oral Tablet Dispersible Take 1 tablet (4 mg total) by mouth every 4 (four) hours as needed for Nausea.  Qty: 10 tablet, Refills: 0                 Supplementary Documentation:

## 2024-11-19 NOTE — ED INITIAL ASSESSMENT (HPI)
Patient reports vomiting, and diarrhea since Saturday.  Patient reports he cannot keep anything down.

## 2024-11-26 ENCOUNTER — TELEPHONE (OUTPATIENT)
Dept: INTERNAL MEDICINE CLINIC | Facility: CLINIC | Age: 54
End: 2024-11-26

## 2024-11-26 NOTE — TELEPHONE ENCOUNTER
[facility] faxed over diabetic eye exam, eye exam has been abstracted and placed in AMS bin for review.

## 2024-12-18 NOTE — TELEPHONE ENCOUNTER
Please review; protocol failed/No Protocol    Requested Prescriptions   Pending Prescriptions Disp Refills    METFORMIN  MG Oral Tablet 24 Hr [Pharmacy Med Name: METFORMIN ER 500MG 24HR TABS] 90 tablet 1     Sig: TAKE 1 TABLET(500 MG) BY MOUTH DAILY       Diabetes Medication Protocol Failed - 12/18/2024  3:02 PM        Failed - Last A1C < 7.5 and within past 6 months     Lab Results   Component Value Date    A1C 6.1 (A) 06/14/2024             Failed - EGFRCR or GFRNAA > 50     GFR Evaluation  EGFRCR: 47 , resulted on 11/19/2024          Passed - In person appointment or virtual visit in the past 6 mos or appointment in next 3 mos     Recent Outpatient Visits              6 months ago Annual physical exam    [facility], [address] [place] [address] Jocelyne Mott DO    Office Visit    1 year ago Acute viral syndrome    [facility], Walk-In Clinic, [address] [place] [address] Maria Del Rosario Hamtpon PA-C    Office Visit    1 year ago Controlled type 2 diabetes mellitus without complication, without long-term current use of insulin ([facility])    [address] Jocelyne Mott DO    Office Visit    1 year ago Sore throat    [facility], Walk-In [facility], [address] [place] [address] Lorri Pryor PA-C    Office Visit    1 year ago Annual physical exam    [address] Jocelyne Mott DO    Office Visit          Future Appointments         Provider Department Appt Notes    In 2 days Jocelyne Mott DO [address] This is A1C 6 month visit. ok NEVAEH                    Passed - Microalbumin procedure in past 12 months or taking ACE/ARB        Passed - GFR in the past 12 months           Future Appointments         Provider Department Appt Notes    In 2 days Jocelyne Mott DO [address] This is A1C 6 month visit. ok  JA          Recent Outpatient Visits              6 months ago Annual physical exam    [facility] ([place] [place] [address] [place] [address], Jocelyne Mckay DO    Office Visit    1 year ago Acute viral syndrome    [facility] ([place] [place]), Walk-In Clinic, [address] [place] [address] Maria Del Rosario Beard PA-C    Office Visit    1 year ago Controlled type 2 diabetes mellitus without complication, without long-term current use of insulin ([facility])    [facility] ([place] [place]), [address] [place] [address], [place]    Office Visit    1 year ago Sore throat    [facility] ([place] [place]), Walk-In Clinic, [address] [place] [address], [place] PA-C    Office Visit    1 year ago Annual physical exam    [facility] ([place] [place]), [address] [place] [address], [place]    Office Visit

## 2024-12-19 RX ORDER — METFORMIN HYDROCHLORIDE 500 MG/1
500 TABLET, EXTENDED RELEASE ORAL DAILY
Qty: 90 TABLET | Refills: 0 | Status: SHIPPED | OUTPATIENT
Start: 2024-12-19

## 2024-12-20 ENCOUNTER — TELEPHONE (OUTPATIENT)
Dept: INTERNAL MEDICINE CLINIC | Facility: CLINIC | Age: 54
End: 2024-12-20

## 2024-12-20 NOTE — TELEPHONE ENCOUNTER
Patient was scheduled for an appt on 12/20/2024.    Appointment was a No Show      Letter mailed to home address on file.

## 2024-12-20 NOTE — TELEPHONE ENCOUNTER
Patient inquiring on the status of the medication below.  Patient has upcoming appointment discuss medication management.   Patient states has 4 doses left.

## 2025-01-06 DIAGNOSIS — E11.9 CONTROLLED TYPE 2 DIABETES MELLITUS WITHOUT COMPLICATION, WITHOUT LONG-TERM CURRENT USE OF INSULIN (HCC): ICD-10-CM

## 2025-01-09 RX ORDER — SEMAGLUTIDE 1.34 MG/ML
1 INJECTION, SOLUTION SUBCUTANEOUS WEEKLY
Qty: 9 ML | Refills: 0 | Status: SHIPPED | OUTPATIENT
Start: 2025-01-09

## 2025-01-09 NOTE — TELEPHONE ENCOUNTER
Please review. Protocol Failed; No Protocol    Requested Prescriptions   Pending Prescriptions Disp Refills    OZEMPIC, 1 MG/DOSE, 4 MG/3ML Subcutaneous Solution Pen-injector [Pharmacy Med Name: OZEMPIC 1MG PER DOSE (4MG/3ML) PFP] 9 mL 1     Sig: INJECT 1 MG UNDER THE SKIN ONCE A WEEK       Diabetes Medication Protocol Failed - 1/9/2025  3:12 PM        Failed - Last A1C < 7.5 and within past 6 months     Lab Results   Component Value Date    A1C 6.1 (A) 06/14/2024             Failed - EGFRCR or GFRNAA > 50     GFR Evaluation  EGFRCR: 47 , resulted on 11/19/2024          Passed - In person appointment or virtual visit in the past 6 mos or appointment in next 3 mos     Recent Outpatient Visits              6 months ago Annual physical exam    [address] Jocelyne Mott DO    Office Visit    1 year ago Acute viral syndrome    [facility], Walk-In [facility], [address] [place] [address] Maria Del Rosario Hampton PA-C    Office Visit    1 year ago Controlled type 2 diabetes mellitus without complication, without long-term current use of insulin ([facility])    [address] Jocelyne Mott DO    Office Visit    1 year ago Sore throat    [facility], Walk-In [facility], [address] [place] [address] Lorri Pryor PA-C    Office Visit    1 year ago Annual physical exam    [address] Jocelyne Mott DO    Office Visit          Future Appointments         Provider Department Appt Notes    In 2 weeks Jocelyne Mott DO [address] 6mo DM f/u  r/s from 1/21, AMS is out. - sm                    Passed - Microalbumin procedure in past 12 months or taking ACE/ARB        Passed - GFR in the past 12 months        Passed - Medication is active on med list               Future Appointments         Provider Department Appt Notes    In 2 weeks Pantera  DO Jocelyne [facility], [address] [place] [address] 6mo DM f/u  r/s from 1/21, AMS is out. - sm          Recent Outpatient Visits              6 months ago Annual physical exam    [facility], [address] [place], [place] [address]Jocelyne Barron DO    Office Visit    1 year ago Acute viral syndrome    [facility], Walk-In Clinic, [address] [place] [address] Maria Del Rosario Hampton PA-C    Office Visit    1 year ago Controlled type 2 diabetes mellitus without complication, without long-term current use of insulin ([facility])    [facility], [address] [place], [place] [address]Jocelyne Barron DO    Office Visit    1 year ago Sore throat    [facility], Walk-In Clinic, [address] [place] [address] Lorri Pryor PA-C    Office Visit    1 year ago Annual physical exam    [facility], [address] [place], [place] [address]Jocelyne Barron DO    Office Visit

## 2025-01-28 ENCOUNTER — OFFICE VISIT (OUTPATIENT)
Dept: INTERNAL MEDICINE CLINIC | Facility: CLINIC | Age: 55
End: 2025-01-28
Payer: COMMERCIAL

## 2025-01-28 ENCOUNTER — TELEPHONE (OUTPATIENT)
Dept: INTERNAL MEDICINE CLINIC | Facility: CLINIC | Age: 55
End: 2025-01-28

## 2025-01-28 VITALS
HEIGHT: 70 IN | BODY MASS INDEX: 32.21 KG/M2 | DIASTOLIC BLOOD PRESSURE: 76 MMHG | WEIGHT: 225 LBS | OXYGEN SATURATION: 98 % | HEART RATE: 58 BPM | TEMPERATURE: 98 F | RESPIRATION RATE: 14 BRPM | SYSTOLIC BLOOD PRESSURE: 112 MMHG

## 2025-01-28 DIAGNOSIS — I10 ESSENTIAL HYPERTENSION: ICD-10-CM

## 2025-01-28 DIAGNOSIS — Z13.0 SCREENING FOR DEFICIENCY ANEMIA: ICD-10-CM

## 2025-01-28 DIAGNOSIS — Z12.5 SCREENING PSA (PROSTATE SPECIFIC ANTIGEN): ICD-10-CM

## 2025-01-28 DIAGNOSIS — E78.2 MIXED HYPERLIPIDEMIA: ICD-10-CM

## 2025-01-28 DIAGNOSIS — E11.9 CONTROLLED TYPE 2 DIABETES MELLITUS WITHOUT COMPLICATION, WITHOUT LONG-TERM CURRENT USE OF INSULIN (HCC): Primary | ICD-10-CM

## 2025-01-28 DIAGNOSIS — L30.9 DERMATITIS: ICD-10-CM

## 2025-01-28 LAB — HEMOGLOBIN A1C: 7 % (ref 4.3–5.6)

## 2025-01-28 PROCEDURE — 3074F SYST BP LT 130 MM HG: CPT | Performed by: FAMILY MEDICINE

## 2025-01-28 PROCEDURE — 3051F HG A1C>EQUAL 7.0%<8.0%: CPT | Performed by: FAMILY MEDICINE

## 2025-01-28 PROCEDURE — 83036 HEMOGLOBIN GLYCOSYLATED A1C: CPT | Performed by: FAMILY MEDICINE

## 2025-01-28 PROCEDURE — 99214 OFFICE O/P EST MOD 30 MIN: CPT | Performed by: FAMILY MEDICINE

## 2025-01-28 PROCEDURE — 90471 IMMUNIZATION ADMIN: CPT | Performed by: FAMILY MEDICINE

## 2025-01-28 PROCEDURE — 3078F DIAST BP <80 MM HG: CPT | Performed by: FAMILY MEDICINE

## 2025-01-28 PROCEDURE — 3008F BODY MASS INDEX DOCD: CPT | Performed by: FAMILY MEDICINE

## 2025-01-28 PROCEDURE — 90656 IIV3 VACC NO PRSV 0.5 ML IM: CPT | Performed by: FAMILY MEDICINE

## 2025-01-28 RX ORDER — CLOTRIMAZOLE 1 %
1 CREAM (GRAM) TOPICAL 2 TIMES DAILY
Qty: 28 G | Refills: 0 | Status: SHIPPED | OUTPATIENT
Start: 2025-01-28

## 2025-01-28 RX ORDER — SEMAGLUTIDE 1.34 MG/ML
1 INJECTION, SOLUTION SUBCUTANEOUS WEEKLY
Qty: 9 ML | Refills: 1 | Status: SHIPPED | OUTPATIENT
Start: 2025-01-28

## 2025-01-28 RX ORDER — HYDROCORTISONE 25 MG/G
1 OINTMENT TOPICAL 2 TIMES DAILY PRN
Qty: 20 G | Refills: 0 | Status: SHIPPED | OUTPATIENT
Start: 2025-01-28

## 2025-01-28 RX ORDER — ALBUTEROL SULFATE 90 UG/1
2 INHALANT RESPIRATORY (INHALATION) EVERY 6 HOURS PRN
Qty: 1 EACH | Refills: 0 | Status: SHIPPED | OUTPATIENT
Start: 2025-01-28

## 2025-01-28 NOTE — PROGRESS NOTES
Subjective:   Patient ID: Nimesh Mike is a 54 year old male.    HPI Here for f/u. Patient has been taking meds as prescribed with no issues. Reduced dose of Metformin to 500mg as planned due to possible low blood sugars 6 months ago. No hypoglycemic symptoms recently.   Needs refill of topical steroid he uses on his penis for intermittent small area of redness and itch. Not currently present. Occurs most often after intercourse.     History/Other:   Review of Systems   Respiratory:  Negative for chest tightness and shortness of breath.    Cardiovascular:  Negative for chest pain and palpitations.   Neurological:  Negative for dizziness and light-headedness.     Current Outpatient Medications   Medication Sig Dispense Refill    albuterol 108 (90 Base) MCG/ACT Inhalation Aero Soln Inhale 2 puffs into the lungs every 6 (six) hours as needed for Wheezing. 1 each 0    hydrocortisone 2.5 % External Ointment Apply 1 Application topically 2 (two) times daily as needed. 20 g 0    clotrimazole 1 % External Cream Apply 1 Application topically 2 (two) times daily. 28 g 0    semaglutide (OZEMPIC, 1 MG/DOSE,) 4 MG/3ML Subcutaneous Solution Pen-injector Inject 1 mg into the skin once a week. 9 mL 1    metFORMIN  MG Oral Tablet 24 Hr Take 1 tablet (500 mg total) by mouth daily. 90 tablet 0    atorvastatin 10 MG Oral Tab Take 1 tablet (10 mg total) by mouth nightly. 90 tablet 3    lisinopril-hydroCHLOROthiazide 20-25 MG Oral Tab Take 1 tablet by mouth daily. 90 tablet 3    Insulin Pen Needle (PEN NEEDLES) 32G X 4 MM Does not apply Misc 1 each by Does not apply route every 7 days. 30 each 0     Allergies:Allergies[1]    Objective:   Physical Exam  Vitals reviewed.   Constitutional:       Appearance: Normal appearance. He is well-developed.   HENT:      Head: Normocephalic and atraumatic.   Pulmonary:      Effort: Pulmonary effort is normal.   Neurological:      Mental Status: He is alert.   Psychiatric:         Mood and  Affect: Mood normal.         Behavior: Behavior normal.       Bilateral barefoot skin diabetic exam is normal, visualized feet and the appearance is normal.  Bilateral monofilament/sensation of both feet is normal.  Pulsation pedal pulse exam of both lower legs/feet is normal as well.      Assessment & Plan:   1. Controlled type 2 diabetes mellitus without complication, without long-term current use of insulin (HCC)    2. Essential hypertension    3. Dermatitis    4. Mixed hyperlipidemia    5. Screening PSA (prostate specific antigen)    6. Screening for deficiency anemia    A1c at goal on reduced Metformin. Continue current meds. Recheck A1c in 6 months.   BP controlled on med. Continue.   Rx topical anti-fungal x 2 weeks to see if no longer intermittently present.   4-6. Check labs prior to physical in 6 months.     Orders Placed This Encounter   Procedures    POC Hemoglobin A1C    Comp Metabolic Panel (14)    Lipid Panel    PSA Total, Screen    CBC With Differential With Platelet    Hemoglobin A1C    Microalb/Creat Ratio, Random Urine    Fluzone trivalent vaccine, PF 0.5mL, 6mo+ ([id number])       Meds This Visit:  Requested Prescriptions     Signed Prescriptions Disp Refills    albuterol 108 (90 Base) MCG/ACT Inhalation Aero Soln 1 each 0     Sig: Inhale 2 puffs into the lungs every 6 (six) hours as needed for Wheezing.    hydrocortisone 2.5 % External Ointment 20 g 0     Sig: Apply 1 Application topically 2 (two) times daily as needed.    clotrimazole 1 % External Cream 28 g 0     Sig: Apply 1 Application topically 2 (two) times daily.    semaglutide (OZEMPIC, 1 MG/DOSE,) 4 MG/3ML Subcutaneous Solution Pen-injector 9 mL 1     Sig: Inject 1 mg into the skin once a week.       Imaging & Referrals:  INFLUENZA VACCINE, TRI, PRESERV FREE, 0.5 ML         [1] No Known Allergies

## 2025-01-28 NOTE — TELEPHONE ENCOUNTER
Patient called request labs prior to their annual physical.  Annual physical scheduled for 7/8/25.   Please order labs. Patient preferred lab is [facility]  Patient informed request was sent to clinical team.  Patient informed to fast for labs.  No callback required.

## 2025-01-30 NOTE — TELEPHONE ENCOUNTER
Received call from Gavin with appeals department. Per Gavin, they received expedited appeal request and the diagnosis code does not qualify for expedited review. They have changed to standard review.

## 2025-02-10 ENCOUNTER — APPOINTMENT (OUTPATIENT)
Dept: GENERAL RADIOLOGY | Age: 55
End: 2025-02-10
Attending: EMERGENCY MEDICINE
Payer: COMMERCIAL

## 2025-02-10 ENCOUNTER — HOSPITAL ENCOUNTER (OUTPATIENT)
Age: 55
Discharge: HOME | End: 2025-02-10
Attending: EMERGENCY MEDICINE
Payer: COMMERCIAL

## 2025-02-10 VITALS
RESPIRATION RATE: 18 BRPM | HEART RATE: 71 BPM | HEIGHT: 70 IN | DIASTOLIC BLOOD PRESSURE: 76 MMHG | BODY MASS INDEX: 31.5 KG/M2 | WEIGHT: 220 LBS | SYSTOLIC BLOOD PRESSURE: 125 MMHG | OXYGEN SATURATION: 95 % | TEMPERATURE: 98 F

## 2025-02-10 DIAGNOSIS — J06.9 VIRAL URI WITH COUGH: Primary | ICD-10-CM

## 2025-02-10 LAB
ATRIAL RATE: 72 BPM
P AXIS: 16 DEGREES
P-R INTERVAL: 178 MS
Q-T INTERVAL: 428 MS
QRS DURATION: 152 MS
QTC CALCULATION (BEZET): 468 MS
R AXIS: -68 DEGREES
SARS-COV-2 RNA RESP QL NAA+PROBE: NOT DETECTED
T AXIS: -5 DEGREES
TROPONIN I BLD-MCNC: <0.02 NG/ML
VENTRICULAR RATE: 72 BPM

## 2025-02-10 PROCEDURE — 99214 OFFICE O/P EST MOD 30 MIN: CPT

## 2025-02-10 PROCEDURE — 36415 COLL VENOUS BLD VENIPUNCTURE: CPT

## 2025-02-10 PROCEDURE — 93010 ELECTROCARDIOGRAM REPORT: CPT

## 2025-02-10 PROCEDURE — 84484 ASSAY OF TROPONIN QUANT: CPT

## 2025-02-10 PROCEDURE — 71046 X-RAY EXAM CHEST 2 VIEWS: CPT | Performed by: EMERGENCY MEDICINE

## 2025-02-10 PROCEDURE — 93005 ELECTROCARDIOGRAM TRACING: CPT

## 2025-02-10 RX ORDER — BENZONATATE 100 MG/1
100 CAPSULE ORAL 3 TIMES DAILY PRN
Qty: 20 CAPSULE | Refills: 0 | Status: SHIPPED | OUTPATIENT
Start: 2025-02-10

## 2025-02-10 NOTE — DISCHARGE INSTRUCTIONS
Follow up with your primary care doctor  Take benzonatate cough medicine three times a day as needed  Take tylenol or motrin for fever  Return if any worsening symptoms or new concern

## 2025-02-10 NOTE — ED PROVIDER NOTES
Patient Seen in: [facility]      History     Chief Complaint   Patient presents with    Cough/URI     Stated Complaint: Cough; Chest Congestion    Subjective:   HPI      54-year-old male presents to the immediate care for complaints of 4 days of persistent dry cough.  Complains of chest congestion and pressure.  Denies any fevers or chills or myalgias.  Denies any nausea vomit diaphoresis.  Patient denies any other exacerbating leaving factors.  Patient has a history of coronary artery disease diabetes hypertension hyperlipidemia.    Objective:     Past Medical History:    Acute conjunctivitis    Allergic rhinitis    CAD (coronary artery disease)    Cellulitis    Dehydration    Diabetes (HCC)    Diabetes mellitus, type II (HCC)    Diarrhea    Dysfunctions associated with sleep stages or arousal from sleep    Effusion of lower leg joint    Hiatal hernia    High blood pressure    Hyperglycemia    Hyperlipidemia    Internal derangement of knee joint    Left Knee     Mitral valve prolapse    Obesity    Osteoarthrosis, unspecified whether generalized or localized, lower leg    L leg    Sensorineural hearing loss    sensory hearing loss unilaterally, Rt ear    Skin tag    Snoring    Unspecified essential hypertension    Visual impairment    glasses    Vomiting              Past Surgical History:   Procedure Laterality Date    Hernia surgery      hernia repair    Knee replacement surgery  2018 complete    Knee surgery      Other      right ankle soft tissues surgery - [facility]    Other surgical history      ORIF left arm and nose - no metal     Repair ing hernia,5+y/o,reducibl      Vasectomy  3/1/2015                Social History     Socioeconomic History    Marital status:    Tobacco Use    Smoking status: Never    Smokeless tobacco: Never   Vaping Use    Vaping status: Never Used   Substance and Sexual Activity    Alcohol use: Yes     Alcohol/week: 1.0 standard drink of alcohol     Types: 1 Cans of  beer per week     Comment: once a week casual    Drug use: No    Sexual activity: Yes     Partners: Female   Other Topics Concern    Caffeine Concern Yes    Exercise Yes    Seat Belt Yes    Special Diet No    Stress Concern Yes     Comment: Always  worrying    Weight Concern Yes     Comment: Constantly              Review of Systems    Positive for stated complaint: Cough; Chest Congestion  Other systems are as noted in HPI.  Constitutional and vital signs reviewed.      All other systems reviewed and negative except as noted above.    Physical Exam     ED Triage Vitals [02/10/25 1231]   /76   Pulse 71   Resp 18   Temp 97.8 °F (36.6 °C)   Temp src Oral   SpO2 95 %   O2 Device None (Room air)       Current Vitals:   Vital Signs  BP: 125/76  Pulse: 71  Resp: 18  Temp: 97.8 °F (36.6 °C)  Temp src: Oral    Oxygen Therapy  SpO2: 95 %  O2 Device: None (Room air)        Physical Exam  General: Alert and oriented. No acute distress.  HEENT: Normocephalic. No evidence of trauma. Extraocular movements are intact.  Cardiovascular exam: Regular rate and rhythm  Lungs: Clear to auscultation bilaterally.  Abdomen: Soft, nondistended, nontender.  Extremities: No evidence of deformity. No clubbing or cyanosis.  Neuro: No focal deficit is noted.    ED Course     Labs Reviewed   ISTAT TROPONIN - Normal   RAPID SARS-COV-2 BY PCR - Normal     EKG    Rate, intervals and axes as noted on EKG Report.  Rate: 72  Rhythm: Sinus Rhythm  Reading: Normal sinus rhythm.  Right bundle branch block.  Ventricular rate 72.  No acute ST elevation or depression.  Rate, axis axis, normals are noted.  Agree with computer interpretation         Chest xray negative for acute finding.  No infiltrate, effusion or pneumothorax.  Clinically suspect a viral upper respiratory infection.  Will discharge home with benzonatate and follow up with follow PCP.       MDM   Patient was screened and evaluated during this visit.   As a treating physician attending  to the patient, I determined, within reasonable clinical confidence and prior to discharge, that an emergency medical condition was not or was no longer present.  There was no indication for further evaluation, treatment or admission on an emergency basis.  Comprehensive verbal and written discharge and follow-up instructions were provided to help prevent relapse or worsening.  Patient was instructed to follow-up with her primary care provider for further evaluation and treatment, but to return immediately to the ER for worsening, concerning, new, changing or persisting symptoms.  I discussed the case with the patient and they had no questions, complaints, or concerns.  Patient felt comfortable going home.    ^^Please note that this report has been produced using speech recognition software and may contain errors related to that system including, but not limited to, errors in grammar, punctuation, and spelling, as well as words and phrases that possibly may have been recognized inappropriately.  If there are any questions or concerns, contact the dictating provider for clarification        Medical Decision Making      Disposition and Plan     Clinical Impression:  1. Viral URI with cough         Disposition:  Discharge  2/10/2025  1:43 pm    Follow-up:  Jocelyne Mott DO  [address] [place] [address], [place] [address]  [phone]    Call   As needed, If symptoms worsen          Medications Prescribed:  Current Discharge Medication List        START taking these medications    Details   benzonatate 100 MG Oral Cap Take 1 capsule (100 mg total) by mouth 3 (three) times daily as needed for cough.  Qty: 20 capsule, Refills: 0                 Supplementary Documentation:

## 2025-02-11 ENCOUNTER — PATIENT MESSAGE (OUTPATIENT)
Dept: INTERNAL MEDICINE CLINIC | Facility: CLINIC | Age: 55
End: 2025-02-11

## 2025-02-12 NOTE — TELEPHONE ENCOUNTER
LOV 1/28/25     Seen in [facility] 2/10/25   Narrative  Performed by: MUSE  Normal sinus rhythm  Right bundle branch block  Left anterior fascicular block   Bifascicular block   Minimal voltage criteria for LVH, may be normal variant ( R in aVL )  Abnormal ECG  When compared with ECG of 04-DEC-2017 10:55,  Right bundle branch block is now Present  Confirmed by BLADIMIR Posada, Shivterhonda ([id number]) on 2/10/2025 3:41:02 PM   Specimen Collected: 02/10/25  1:00 PM Last Resulted: 02/10/25  3:41 PM       AMS- Please advise if you would like pt to make follow up appointment or if you would like to reply here with comments on results

## 2025-02-13 NOTE — TELEPHONE ENCOUNTER
Savanna from appeals department of [employer] called stating that they are going to approve the ozempic. They will send a report to the office to this effect. Please allow 3 business days in order to get report showing they overturned it.    Phone# [phone]

## 2025-03-26 DIAGNOSIS — E11.9 TYPE 2 DIABETES MELLITUS WITHOUT COMPLICATION, WITHOUT LONG-TERM CURRENT USE OF INSULIN (HCC): ICD-10-CM

## 2025-03-26 DIAGNOSIS — I10 ESSENTIAL HYPERTENSION: ICD-10-CM

## 2025-03-31 RX ORDER — METFORMIN HYDROCHLORIDE 500 MG/1
500 TABLET, EXTENDED RELEASE ORAL DAILY
Qty: 90 TABLET | Refills: 0 | Status: SHIPPED | OUTPATIENT
Start: 2025-03-31

## 2025-03-31 RX ORDER — LISINOPRIL AND HYDROCHLOROTHIAZIDE 20; 25 MG/1; MG/1
1 TABLET ORAL DAILY
Qty: 90 TABLET | Refills: 0 | Status: SHIPPED | OUTPATIENT
Start: 2025-03-31

## 2025-03-31 NOTE — TELEPHONE ENCOUNTER
Please review.  Protocol failed / Has no protocol.     Marked High Priority, patient states out of medication    Requested Prescriptions   Pending Prescriptions Disp Refills    METFORMIN  MG Oral Tablet 24 Hr [Pharmacy Med Name: METFORMIN ER 500MG 24HR TABS] 90 tablet 3     Sig: TAKE 1 TABLET(500 MG) BY MOUTH DAILY       Diabetes Medication Protocol Failed - 3/31/2025  9:18 AM        Failed - EGFRCR or GFRNAA > 50     GFR Evaluation  EGFRCR: 47 , resulted on 11/19/2024        Passed - Last A1C < 7.5 and within past 6 months        Passed - In person appointment or virtual visit in the past 6 mos or appointment in next 3 mos        Passed - Microalbumin procedure in past 12 months or taking ACE/ARB        Passed - GFR in the past 12 months        Passed - Medication is active on med list          lisinopril-hydroCHLOROthiazide 20-25 MG Oral Tab 90 tablet 3     Sig: Take 1 tablet by mouth daily.       Hypertension Medications Protocol Failed - 3/31/2025  9:18 AM        Failed - EGFRCR or GFRNAA > 50     GFR Evaluation  EGFRCR: 47 , resulted on 11/19/2024        Passed - CMP or BMP in past 12 months        Passed - Last BP reading less than 140/90        Passed - In person appointment or virtual visit in the past 12 mos or appointment in next 3 mos        Passed - Medication is active on med list

## 2025-06-22 ENCOUNTER — PATIENT MESSAGE (OUTPATIENT)
Dept: INTERNAL MEDICINE CLINIC | Facility: CLINIC | Age: 55
End: 2025-06-22

## 2025-07-01 DIAGNOSIS — I10 ESSENTIAL HYPERTENSION: ICD-10-CM

## 2025-07-01 DIAGNOSIS — E11.9 TYPE 2 DIABETES MELLITUS WITHOUT COMPLICATION, WITHOUT LONG-TERM CURRENT USE OF INSULIN (HCC): ICD-10-CM

## 2025-07-03 RX ORDER — METFORMIN HYDROCHLORIDE 500 MG/1
500 TABLET, EXTENDED RELEASE ORAL DAILY
Qty: 90 TABLET | Refills: 0 | Status: SHIPPED | OUTPATIENT
Start: 2025-07-03

## 2025-07-03 NOTE — TELEPHONE ENCOUNTER
Future Appointments   Date Time Provider Department Center   8/12/2025  7:00 AM Jocelyne Mott DO [facility]

## 2025-07-06 RX ORDER — LISINOPRIL AND HYDROCHLOROTHIAZIDE 20; 25 MG/1; MG/1
1 TABLET ORAL DAILY
Qty: 90 TABLET | Refills: 0 | Status: SHIPPED | OUTPATIENT
Start: 2025-07-06

## 2025-08-05 ENCOUNTER — LAB ENCOUNTER (OUTPATIENT)
Dept: LAB | Age: 55
End: 2025-08-05
Attending: FAMILY MEDICINE

## 2025-08-05 DIAGNOSIS — E11.9 CONTROLLED TYPE 2 DIABETES MELLITUS WITHOUT COMPLICATION, WITHOUT LONG-TERM CURRENT USE OF INSULIN (HCC): ICD-10-CM

## 2025-08-05 DIAGNOSIS — Z12.5 SCREENING PSA (PROSTATE SPECIFIC ANTIGEN): ICD-10-CM

## 2025-08-05 DIAGNOSIS — E78.2 MIXED HYPERLIPIDEMIA: ICD-10-CM

## 2025-08-05 DIAGNOSIS — Z13.0 SCREENING FOR DEFICIENCY ANEMIA: ICD-10-CM

## 2025-08-05 LAB
ALBUMIN SERPL-MCNC: 4.3 G/DL (ref 3.2–4.8)
ALBUMIN/GLOB SERPL: 1.6 (ref 1–2)
ALP LIVER SERPL-CCNC: 52 U/L (ref 45–117)
ALT SERPL-CCNC: 13 U/L (ref 10–49)
ANION GAP SERPL CALC-SCNC: 7 MMOL/L (ref 0–18)
AST SERPL-CCNC: 14 U/L (ref ?–34)
BASOPHILS # BLD AUTO: 0.07 X10(3) UL (ref 0–0.2)
BASOPHILS NFR BLD AUTO: 1 %
BILIRUB SERPL-MCNC: 0.8 MG/DL (ref 0.3–1.2)
BUN BLD-MCNC: 25 MG/DL (ref 9–23)
CALCIUM BLD-MCNC: 9.2 MG/DL (ref 8.7–10.6)
CHLORIDE SERPL-SCNC: 105 MMOL/L (ref 98–112)
CHOLEST SERPL-MCNC: 125 MG/DL (ref ?–200)
CO2 SERPL-SCNC: 29 MMOL/L (ref 21–32)
COMPLEXED PSA SERPL-MCNC: 1.08 NG/ML (ref ?–4)
CREAT BLD-MCNC: 1.27 MG/DL (ref 0.7–1.3)
CREAT UR-SCNC: 238.1 MG/DL
EGFRCR SERPLBLD CKD-EPI 2021: 66 ML/MIN/1.73M2 (ref 60–?)
EOSINOPHIL # BLD AUTO: 0.24 X10(3) UL (ref 0–0.7)
EOSINOPHIL NFR BLD AUTO: 3.5 %
ERYTHROCYTE [DISTWIDTH] IN BLOOD BY AUTOMATED COUNT: 12.8 %
EST. AVERAGE GLUCOSE BLD GHB EST-MCNC: 157 MG/DL (ref 68–126)
FASTING PATIENT LIPID ANSWER: YES
FASTING STATUS PATIENT QL REPORTED: YES
GLOBULIN PLAS-MCNC: 2.7 G/DL (ref 2–3.5)
GLUCOSE BLD-MCNC: 168 MG/DL (ref 70–99)
HBA1C MFR BLD: 7.1 % (ref ?–5.7)
HCT VFR BLD AUTO: 42 % (ref 39–53)
HDLC SERPL-MCNC: 39 MG/DL (ref 40–59)
HGB BLD-MCNC: 14.5 G/DL (ref 13–17.5)
IMM GRANULOCYTES # BLD AUTO: 0.02 X10(3) UL (ref 0–1)
IMM GRANULOCYTES NFR BLD: 0.3 %
LDLC SERPL CALC-MCNC: 70 MG/DL (ref ?–100)
LYMPHOCYTES # BLD AUTO: 2.02 X10(3) UL (ref 1–4)
LYMPHOCYTES NFR BLD AUTO: 29.3 %
MCH RBC QN AUTO: 31.4 PG (ref 26–34)
MCHC RBC AUTO-ENTMCNC: 34.5 G/DL (ref 31–37)
MCV RBC AUTO: 90.9 FL (ref 80–100)
MICROALBUMIN UR-MCNC: 0.6 MG/DL
MICROALBUMIN/CREAT 24H UR-RTO: 2.5 UG/MG (ref ?–30)
MONOCYTES # BLD AUTO: 0.68 X10(3) UL (ref 0.1–1)
MONOCYTES NFR BLD AUTO: 9.9 %
NEUTROPHILS # BLD AUTO: 3.86 X10 (3) UL (ref 1.5–7.7)
NEUTROPHILS # BLD AUTO: 3.86 X10(3) UL (ref 1.5–7.7)
NEUTROPHILS NFR BLD AUTO: 56 %
NONHDLC SERPL-MCNC: 86 MG/DL (ref ?–130)
OSMOLALITY SERPL CALC.SUM OF ELEC: 300 MOSM/KG (ref 275–295)
PLATELET # BLD AUTO: 250 10(3)UL (ref 150–450)
POTASSIUM SERPL-SCNC: 4 MMOL/L (ref 3.5–5.1)
PROT SERPL-MCNC: 7 G/DL (ref 5.7–8.2)
RBC # BLD AUTO: 4.62 X10(6)UL (ref 4.3–5.7)
SODIUM SERPL-SCNC: 141 MMOL/L (ref 136–145)
TRIGL SERPL-MCNC: 81 MG/DL (ref 30–149)
VLDLC SERPL CALC-MCNC: 12 MG/DL (ref 0–30)
WBC # BLD AUTO: 6.9 X10(3) UL (ref 4–11)

## 2025-08-05 PROCEDURE — 82570 ASSAY OF URINE CREATININE: CPT | Performed by: FAMILY MEDICINE

## 2025-08-05 PROCEDURE — 83036 HEMOGLOBIN GLYCOSYLATED A1C: CPT | Performed by: FAMILY MEDICINE

## 2025-08-05 PROCEDURE — 80053 COMPREHEN METABOLIC PANEL: CPT | Performed by: FAMILY MEDICINE

## 2025-08-05 PROCEDURE — 84153 ASSAY OF PSA TOTAL: CPT | Performed by: FAMILY MEDICINE

## 2025-08-05 PROCEDURE — 82043 UR ALBUMIN QUANTITATIVE: CPT | Performed by: FAMILY MEDICINE

## 2025-08-05 PROCEDURE — 85025 COMPLETE CBC W/AUTO DIFF WBC: CPT | Performed by: FAMILY MEDICINE

## 2025-08-05 PROCEDURE — 80061 LIPID PANEL: CPT | Performed by: FAMILY MEDICINE

## 2025-08-12 ENCOUNTER — OFFICE VISIT (OUTPATIENT)
Age: 55
End: 2025-08-12

## 2025-08-12 VITALS
HEIGHT: 70 IN | WEIGHT: 225.63 LBS | TEMPERATURE: 98 F | BODY MASS INDEX: 32.3 KG/M2 | HEART RATE: 75 BPM | RESPIRATION RATE: 18 BRPM | SYSTOLIC BLOOD PRESSURE: 118 MMHG | OXYGEN SATURATION: 99 % | DIASTOLIC BLOOD PRESSURE: 70 MMHG

## 2025-08-12 DIAGNOSIS — E78.2 MIXED HYPERLIPIDEMIA: ICD-10-CM

## 2025-08-12 DIAGNOSIS — Z00.00 ANNUAL PHYSICAL EXAM: Primary | ICD-10-CM

## 2025-08-12 DIAGNOSIS — E11.9 CONTROLLED TYPE 2 DIABETES MELLITUS WITHOUT COMPLICATION, WITHOUT LONG-TERM CURRENT USE OF INSULIN (HCC): ICD-10-CM

## 2025-08-12 DIAGNOSIS — I10 ESSENTIAL HYPERTENSION: ICD-10-CM

## 2025-08-12 RX ORDER — METFORMIN HYDROCHLORIDE 500 MG/1
1000 TABLET, EXTENDED RELEASE ORAL DAILY
Qty: 180 TABLET | Refills: 1 | Status: SHIPPED | OUTPATIENT
Start: 2025-08-12

## 2025-08-18 DIAGNOSIS — E11.9 TYPE 2 DIABETES MELLITUS WITHOUT COMPLICATION, WITHOUT LONG-TERM CURRENT USE OF INSULIN (HCC): ICD-10-CM

## 2025-08-18 DIAGNOSIS — I10 ESSENTIAL HYPERTENSION: ICD-10-CM

## 2025-08-20 RX ORDER — LISINOPRIL AND HYDROCHLOROTHIAZIDE 20; 25 MG/1; MG/1
1 TABLET ORAL DAILY
Qty: 90 TABLET | Refills: 3 | Status: SHIPPED | OUTPATIENT
Start: 2025-08-20

## 2025-08-28 RX ORDER — ATORVASTATIN CALCIUM 10 MG/1
10 TABLET, FILM COATED ORAL NIGHTLY
Qty: 90 TABLET | Refills: 3 | Status: SHIPPED | OUTPATIENT
Start: 2025-08-28

## (undated) DIAGNOSIS — Z12.5 SCREENING PSA (PROSTATE SPECIFIC ANTIGEN): ICD-10-CM

## (undated) DIAGNOSIS — I10 ESSENTIAL HYPERTENSION: ICD-10-CM

## (undated) DIAGNOSIS — E78.2 MIXED HYPERLIPIDEMIA: ICD-10-CM

## (undated) DIAGNOSIS — Z00.00 ANNUAL PHYSICAL EXAM: ICD-10-CM

## (undated) DIAGNOSIS — E11.9 TYPE 2 DIABETES MELLITUS WITHOUT COMPLICATION, WITHOUT LONG-TERM CURRENT USE OF INSULIN (HCC): ICD-10-CM

## (undated) DIAGNOSIS — E11.9 CONTROLLED TYPE 2 DIABETES MELLITUS WITHOUT COMPLICATION, WITHOUT LONG-TERM CURRENT USE OF INSULIN (HCC): Primary | ICD-10-CM

## (undated) DEVICE — INSTRUMENT FEE

## (undated) DEVICE — CONVERTORS STOCKINETTE: Brand: CONVERTORS

## (undated) DEVICE — WRAP COOLING KNEE W/ICE PILLOW

## (undated) DEVICE — GOWN SURG AERO CHROME XXL

## (undated) DEVICE — 3M™ COBAN™ NL STERILE NON-LATEX SELF-ADHERENT WRAP, 2084S, 4 IN X 5 YD (10 CM X 4,5 M), 18 ROLLS/CASE: Brand: 3M™ COBAN™

## (undated) DEVICE — Device: Brand: PLUMEPEN

## (undated) DEVICE — 3M™ MICROFOAM™ TAPE 1528-4: Brand: 3M™ MICROFOAM™

## (undated) DEVICE — HOOD, PEEL-AWAY: Brand: FLYTE

## (undated) DEVICE — STERILE POLYISOPRENE POWDER-FREE SURGICAL GLOVES: Brand: PROTEXIS

## (undated) DEVICE — SOL  .9 1000ML BTL

## (undated) DEVICE — PADDING CAST COTTON  4

## (undated) DEVICE — BANDAGE ELASTIC ACE 6\" X-LONG

## (undated) DEVICE — Device: Brand: STABLECUT®

## (undated) DEVICE — ZIMMER® STERILE DISPOSABLE TOURNIQUET CUFF WITH PLC, DUAL PORT, SINGLE BLADDER, 34 IN. (86 CM)

## (undated) DEVICE — 3M™ IOBAN™ 2 ANTIMICROBIAL INCISE DRAPE 6651EZ: Brand: IOBAN™ 2

## (undated) DEVICE — TOTAL KNEE CDS: Brand: MEDLINE INDUSTRIES, INC.

## (undated) DEVICE — DRAPE,U/SHT,SPLIT,FILM,60X84,STERILE: Brand: MEDLINE

## (undated) DEVICE — NEEDLE SPINAL 20X3-1/2 YELLOW

## (undated) DEVICE — SYRINGE 30ML LL TIP

## (undated) DEVICE — KENDALL SCD EXPRESS SLEEVES, KNEE LENGTH, MEDIUM: Brand: KENDALL SCD

## (undated) DEVICE — SUTURE VICRYL 2-0 CP-1

## (undated) DEVICE — BOWL CEMENT MIX QUICK-VAC

## (undated) DEVICE — CHLORAPREP 26ML APPLICATOR

## (undated) DEVICE — SUTURE STRATAFIX PDS PLUS 45CM

## (undated) DEVICE — DRESSING AQUACEL AG 3.5X12

## (undated) NOTE — LETTER
12/20/2024    Nimesh Mike  [address] [facility] [address]    Dear Nimesh,    We would like to inform you that your account has been charged $40 for not showing up to the office for your scheduled appointment on 12/20/2024.    Our no-show policy is as follows: A 24-hour notice is required, or you may be charged a $40 No Show fee.      If you are unable to keep your scheduled appointment, please notify us at least 24 hours in advance so we can accommodate our other patients. You may also reschedule your appointment at that time.    On the third no-show, within a 12-month period, it will be the physician’s discretion as to whether a discharge letter will be sent out disengaging you from the practice and giving you 30 days to enroll with a new non [facility] physician.    If you would like to contest this charge, please call [phone].    Sincerely,  [facility]

## (undated) NOTE — MR AVS SNAPSHOT
[address] at [address], [address]  [address] [place], [place] [address]  [phone]               Thank you for choosing us for your health care visit with Mervin Dubin, MD.  We are glad to serve you and happy to provide you w Chew 1 tablet (81 mg total) by mouth daily. What changed:  how much to take           Blood Glucose Monitor System w/Device Kit   As directed / patient choice           Blood Pressure Monitor/L Cuff Misc   As directed/patient choice.   Large cuff to fit a Assoc Dx:  Type 2 diabetes mellitus without complication, without long-term current use of insulin (HCC) [E11.9]           LIPID PANEL REFLEX TO DLDL    Complete by:  As directed    Assoc Dx:  Type 2 diabetes mellitus without complication, without long-te Your blood pressure indicates you may be at-risk for Hypertension. Please consider the following Lifestyle Modifications. Also, please return for a follow-up Blood Pressure Check in 1 month.      Lifestyle Modification Recommendations:    Modification R Start activities slowly and build up over time Do what you like   Get your heart pumping – brisk walking, biking, swimming Even 10 minute increments are effective and add up over the week   2 ½ hours per week – spread out over time Use a blane to keep you

## (undated) NOTE — LETTER
[facility]  Phone: [phone]  Right Fax: [phone]  [facility] [address] Velásquez RN Date: 12/4/17    Patient Name: Gómez Cresson  Surgery Date: 12/18/2017    CSN: [id number]  Medical Record: [id number]

## (undated) NOTE — LETTER
04/01/20        [address] [facility] [facility] [place]. [address]      Dear Michael Pennington,    [address] records indicate that you have outstanding FASTING lab work and or testing that was ordered for you and has not yet been completed:  Please be renetta